# Patient Record
Sex: MALE | Race: WHITE | Employment: OTHER | URBAN - METROPOLITAN AREA
[De-identification: names, ages, dates, MRNs, and addresses within clinical notes are randomized per-mention and may not be internally consistent; named-entity substitution may affect disease eponyms.]

---

## 2018-01-04 ENCOUNTER — RECORDS - HEALTHEAST (OUTPATIENT)
Dept: LAB | Facility: CLINIC | Age: 83
End: 2018-01-04

## 2018-01-04 LAB
ALBUMIN UR-MCNC: ABNORMAL MG/DL
ANION GAP SERPL CALCULATED.3IONS-SCNC: 8 MMOL/L (ref 5–18)
APPEARANCE UR: ABNORMAL
BACTERIA #/AREA URNS HPF: ABNORMAL HPF
BASOPHILS # BLD AUTO: 0 THOU/UL (ref 0–0.2)
BASOPHILS NFR BLD AUTO: 0 % (ref 0–2)
BILIRUB UR QL STRIP: NEGATIVE
BNP SERPL-MCNC: 108 PG/ML (ref 0–93)
BUN SERPL-MCNC: 21 MG/DL (ref 8–28)
CALCIUM SERPL-MCNC: 9.1 MG/DL (ref 8.5–10.5)
CHLORIDE BLD-SCNC: 109 MMOL/L (ref 98–107)
CO2 SERPL-SCNC: 23 MMOL/L (ref 22–31)
COLOR UR AUTO: YELLOW
CREAT SERPL-MCNC: 1.07 MG/DL (ref 0.7–1.3)
EOSINOPHIL # BLD AUTO: 0 THOU/UL (ref 0–0.4)
EOSINOPHIL NFR BLD AUTO: 1 % (ref 0–6)
ERYTHROCYTE [DISTWIDTH] IN BLOOD BY AUTOMATED COUNT: 14.4 % (ref 11–14.5)
GFR SERPL CREATININE-BSD FRML MDRD: >60 ML/MIN/1.73M2
GLUCOSE BLD-MCNC: 115 MG/DL (ref 70–125)
GLUCOSE UR STRIP-MCNC: NEGATIVE MG/DL
HCT VFR BLD AUTO: 40.2 % (ref 40–54)
HGB BLD-MCNC: 13.4 G/DL (ref 14–18)
HGB UR QL STRIP: NEGATIVE
HYALINE CASTS #/AREA URNS LPF: ABNORMAL LPF
KETONES UR STRIP-MCNC: ABNORMAL MG/DL
LEUKOCYTE ESTERASE UR QL STRIP: NEGATIVE
LYMPHOCYTES # BLD AUTO: 0.3 THOU/UL (ref 0.8–4.4)
LYMPHOCYTES NFR BLD AUTO: 4 % (ref 20–40)
MCH RBC QN AUTO: 32 PG (ref 27–34)
MCHC RBC AUTO-ENTMCNC: 33.3 G/DL (ref 32–36)
MCV RBC AUTO: 96 FL (ref 80–100)
MONOCYTES # BLD AUTO: 0.5 THOU/UL (ref 0–0.9)
MONOCYTES NFR BLD AUTO: 6 % (ref 2–10)
MUCOUS THREADS #/AREA URNS LPF: ABNORMAL LPF
NEUTROPHILS # BLD AUTO: 6.2 THOU/UL (ref 2–7.7)
NEUTROPHILS NFR BLD AUTO: 89 % (ref 50–70)
NITRATE UR QL: NEGATIVE
PH UR STRIP: 6 [PH] (ref 4.5–8)
PLATELET # BLD AUTO: 181 THOU/UL (ref 140–440)
PMV BLD AUTO: 11.1 FL (ref 8.5–12.5)
POTASSIUM BLD-SCNC: 4.3 MMOL/L (ref 3.5–5)
RBC # BLD AUTO: 4.19 MILL/UL (ref 4.4–6.2)
RBC #/AREA URNS AUTO: ABNORMAL HPF
SODIUM SERPL-SCNC: 140 MMOL/L (ref 136–145)
SP GR UR STRIP: 1.02 (ref 1–1.03)
SQUAMOUS #/AREA URNS AUTO: ABNORMAL LPF
UROBILINOGEN UR STRIP-ACNC: ABNORMAL
WBC #/AREA URNS AUTO: ABNORMAL HPF
WBC: 7.1 THOU/UL (ref 4–11)

## 2018-02-16 ENCOUNTER — RECORDS - HEALTHEAST (OUTPATIENT)
Dept: LAB | Facility: CLINIC | Age: 83
End: 2018-02-16

## 2018-02-16 LAB
ALBUMIN UR-MCNC: ABNORMAL MG/DL
APPEARANCE UR: CLEAR
BACTERIA #/AREA URNS HPF: ABNORMAL HPF
BILIRUB UR QL STRIP: NEGATIVE
COLOR UR AUTO: YELLOW
GLUCOSE UR STRIP-MCNC: NEGATIVE MG/DL
HGB UR QL STRIP: NEGATIVE
KETONES UR STRIP-MCNC: ABNORMAL MG/DL
LEUKOCYTE ESTERASE UR QL STRIP: NEGATIVE
MUCOUS THREADS #/AREA URNS LPF: ABNORMAL LPF
NITRATE UR QL: NEGATIVE
PH UR STRIP: 5 [PH] (ref 4.5–8)
RBC #/AREA URNS AUTO: ABNORMAL HPF
SP GR UR STRIP: 1.03 (ref 1–1.03)
SQUAMOUS #/AREA URNS AUTO: ABNORMAL LPF
UROBILINOGEN UR STRIP-ACNC: ABNORMAL
WBC #/AREA URNS AUTO: ABNORMAL HPF

## 2018-02-17 LAB — BACTERIA SPEC CULT: NO GROWTH

## 2018-06-29 ENCOUNTER — MEDICAL CORRESPONDENCE (OUTPATIENT)
Dept: HEALTH INFORMATION MANAGEMENT | Facility: CLINIC | Age: 83
End: 2018-06-29

## 2018-07-13 ENCOUNTER — TRANSFERRED RECORDS (OUTPATIENT)
Dept: HEALTH INFORMATION MANAGEMENT | Facility: CLINIC | Age: 83
End: 2018-07-13

## 2018-07-13 ENCOUNTER — APPOINTMENT (OUTPATIENT)
Dept: CT IMAGING | Facility: CLINIC | Age: 83
DRG: 345 | End: 2018-07-13
Attending: EMERGENCY MEDICINE
Payer: COMMERCIAL

## 2018-07-13 ENCOUNTER — HOSPITAL ENCOUNTER (INPATIENT)
Facility: CLINIC | Age: 83
LOS: 1 days | Discharge: NURSING, REHAB OR LONG-TERM CARE | DRG: 345 | End: 2018-07-15
Attending: EMERGENCY MEDICINE | Admitting: HOSPITALIST
Payer: COMMERCIAL

## 2018-07-13 DIAGNOSIS — K21.9 GASTROESOPHAGEAL REFLUX DISEASE WITHOUT ESOPHAGITIS: ICD-10-CM

## 2018-07-13 DIAGNOSIS — R45.1 AGITATION: ICD-10-CM

## 2018-07-13 DIAGNOSIS — N40.1 BENIGN PROSTATIC HYPERPLASIA WITH LOWER URINARY TRACT SYMPTOMS, SYMPTOM DETAILS UNSPECIFIED: ICD-10-CM

## 2018-07-13 DIAGNOSIS — K56.2 SIGMOID VOLVULUS (H): ICD-10-CM

## 2018-07-13 DIAGNOSIS — Z86.73 HISTORY OF TIA (TRANSIENT ISCHEMIC ATTACK) AND STROKE: Primary | ICD-10-CM

## 2018-07-13 DIAGNOSIS — G47.00 INSOMNIA, UNSPECIFIED TYPE: ICD-10-CM

## 2018-07-13 LAB
ALBUMIN SERPL-MCNC: 4.3 G/DL (ref 3.4–5)
ALBUMIN UR-MCNC: 30 MG/DL
ALP SERPL-CCNC: 56 U/L (ref 40–150)
ALT SERPL W P-5'-P-CCNC: 16 U/L (ref 0–70)
ANION GAP SERPL CALCULATED.3IONS-SCNC: 11 MMOL/L (ref 3–14)
APPEARANCE UR: CLEAR
AST SERPL W P-5'-P-CCNC: 14 U/L (ref 0–45)
BASOPHILS # BLD AUTO: 0 10E9/L (ref 0–0.2)
BASOPHILS NFR BLD AUTO: 0 %
BILIRUB SERPL-MCNC: 0.7 MG/DL (ref 0.2–1.3)
BILIRUB UR QL STRIP: NEGATIVE
BUN SERPL-MCNC: 26 MG/DL (ref 7–30)
CALCIUM SERPL-MCNC: 9.2 MG/DL (ref 8.5–10.1)
CHLORIDE SERPL-SCNC: 107 MMOL/L (ref 94–109)
CO2 SERPL-SCNC: 23 MMOL/L (ref 20–32)
COLOR UR AUTO: YELLOW
CREAT SERPL-MCNC: 0.78 MG/DL (ref 0.66–1.25)
DIFFERENTIAL METHOD BLD: ABNORMAL
EOSINOPHIL # BLD AUTO: 0 10E9/L (ref 0–0.7)
EOSINOPHIL NFR BLD AUTO: 0 %
ERYTHROCYTE [DISTWIDTH] IN BLOOD BY AUTOMATED COUNT: 15.4 % (ref 10–15)
GFR SERPL CREATININE-BSD FRML MDRD: >90 ML/MIN/1.7M2
GLUCOSE SERPL-MCNC: 144 MG/DL (ref 70–99)
GLUCOSE UR STRIP-MCNC: NEGATIVE MG/DL
HCT VFR BLD AUTO: 41.3 % (ref 40–53)
HGB BLD-MCNC: 13.6 G/DL (ref 13.3–17.7)
HGB UR QL STRIP: NEGATIVE
HYALINE CASTS #/AREA URNS LPF: 1 /LPF (ref 0–2)
IMM GRANULOCYTES # BLD: 0 10E9/L (ref 0–0.4)
IMM GRANULOCYTES NFR BLD: 0.2 %
INR PPP: 1.14 (ref 0.86–1.14)
INTERPRETATION ECG - MUSE: NORMAL
KETONES UR STRIP-MCNC: 10 MG/DL
LACTATE BLD-SCNC: 1.5 MMOL/L (ref 0.7–2)
LEUKOCYTE ESTERASE UR QL STRIP: NEGATIVE
LIPASE SERPL-CCNC: 353 U/L (ref 73–393)
LYMPHOCYTES # BLD AUTO: 0.9 10E9/L (ref 0.8–5.3)
LYMPHOCYTES NFR BLD AUTO: 13.2 %
MCH RBC QN AUTO: 32.3 PG (ref 26.5–33)
MCHC RBC AUTO-ENTMCNC: 32.9 G/DL (ref 31.5–36.5)
MCV RBC AUTO: 98 FL (ref 78–100)
MONOCYTES # BLD AUTO: 0.3 10E9/L (ref 0–1.3)
MONOCYTES NFR BLD AUTO: 3.9 %
MUCOUS THREADS #/AREA URNS LPF: PRESENT /LPF
NEUTROPHILS # BLD AUTO: 5.3 10E9/L (ref 1.6–8.3)
NEUTROPHILS NFR BLD AUTO: 82.7 %
NITRATE UR QL: NEGATIVE
NRBC # BLD AUTO: 0 10*3/UL
NRBC BLD AUTO-RTO: 0 /100
PH UR STRIP: 5.5 PH (ref 5–7)
PLATELET # BLD AUTO: 191 10E9/L (ref 150–450)
POTASSIUM SERPL-SCNC: 3.4 MMOL/L (ref 3.4–5.3)
PROT SERPL-MCNC: 7.5 G/DL (ref 6.8–8.8)
RBC # BLD AUTO: 4.21 10E12/L (ref 4.4–5.9)
RBC #/AREA URNS AUTO: 1 /HPF (ref 0–2)
SODIUM SERPL-SCNC: 141 MMOL/L (ref 133–144)
SOURCE: ABNORMAL
SP GR UR STRIP: 1.03 (ref 1–1.03)
SQUAMOUS #/AREA URNS AUTO: <1 /HPF (ref 0–1)
TRANS CELLS #/AREA URNS HPF: <1 /HPF (ref 0–1)
TROPONIN I SERPL-MCNC: 0.04 UG/L (ref 0–0.04)
TSH SERPL DL<=0.005 MIU/L-ACNC: 1.92 MU/L (ref 0.4–4)
UROBILINOGEN UR STRIP-MCNC: NORMAL MG/DL (ref 0–2)
WBC # BLD AUTO: 6.4 10E9/L (ref 4–11)
WBC #/AREA URNS AUTO: 1 /HPF (ref 0–5)

## 2018-07-13 PROCEDURE — 93005 ELECTROCARDIOGRAM TRACING: CPT | Performed by: EMERGENCY MEDICINE

## 2018-07-13 PROCEDURE — 96360 HYDRATION IV INFUSION INIT: CPT | Performed by: EMERGENCY MEDICINE

## 2018-07-13 PROCEDURE — 83605 ASSAY OF LACTIC ACID: CPT | Performed by: EMERGENCY MEDICINE

## 2018-07-13 PROCEDURE — 85610 PROTHROMBIN TIME: CPT | Performed by: EMERGENCY MEDICINE

## 2018-07-13 PROCEDURE — 81001 URINALYSIS AUTO W/SCOPE: CPT | Performed by: EMERGENCY MEDICINE

## 2018-07-13 PROCEDURE — 99285 EMERGENCY DEPT VISIT HI MDM: CPT | Mod: Z6 | Performed by: EMERGENCY MEDICINE

## 2018-07-13 PROCEDURE — 25000128 H RX IP 250 OP 636: Performed by: EMERGENCY MEDICINE

## 2018-07-13 PROCEDURE — 74177 CT ABD & PELVIS W/CONTRAST: CPT

## 2018-07-13 PROCEDURE — 99285 EMERGENCY DEPT VISIT HI MDM: CPT | Mod: 25 | Performed by: EMERGENCY MEDICINE

## 2018-07-13 PROCEDURE — 83690 ASSAY OF LIPASE: CPT | Performed by: EMERGENCY MEDICINE

## 2018-07-13 PROCEDURE — 84443 ASSAY THYROID STIM HORMONE: CPT | Performed by: EMERGENCY MEDICINE

## 2018-07-13 PROCEDURE — 80053 COMPREHEN METABOLIC PANEL: CPT | Performed by: EMERGENCY MEDICINE

## 2018-07-13 PROCEDURE — 85025 COMPLETE CBC W/AUTO DIFF WBC: CPT | Performed by: EMERGENCY MEDICINE

## 2018-07-13 PROCEDURE — 84484 ASSAY OF TROPONIN QUANT: CPT | Performed by: EMERGENCY MEDICINE

## 2018-07-13 RX ORDER — SODIUM CHLORIDE 9 MG/ML
INJECTION, SOLUTION INTRAVENOUS CONTINUOUS
Status: DISCONTINUED | OUTPATIENT
Start: 2018-07-13 | End: 2018-07-14

## 2018-07-13 RX ORDER — IOPAMIDOL 755 MG/ML
78 INJECTION, SOLUTION INTRAVASCULAR ONCE
Status: COMPLETED | OUTPATIENT
Start: 2018-07-13 | End: 2018-07-13

## 2018-07-13 RX ADMIN — IOPAMIDOL 78 ML: 755 INJECTION, SOLUTION INTRAVENOUS at 21:51

## 2018-07-13 ASSESSMENT — ENCOUNTER SYMPTOMS
ABDOMINAL DISTENTION: 1
CONSTIPATION: 1

## 2018-07-13 NOTE — LETTER
Health Information Management Services               Recipient:    [facility]   [phone]      Sender:  Peri Pinedo, [facility] [phone]        Date: July 15, 2018  Patient Name:  Hardy Acevedo  Routing Message:    D/C Orders for [place]  Transport Time: 1pm     RN station: [phone]        The documents accompanying this notice contain confidential information belonging to the sender.  This information is intended only for the use of the individual or entity named above.  The authorized recipient of this information is prohibited from disclosing this information to any other party and is required to destroy the information after its stated need has been fulfilled, unless otherwise required by state law.      If you are not the intended recipient, you are hereby notified that any disclosure, copying, distribution or action taken in reliance on the contents of these documents is strictly prohibited. If you have received this document in error, please notify [place] immediately at [phone].  You may return the document via fax ([phone]) or return mail  (Health Information Management, , [address] [place] [address]).

## 2018-07-13 NOTE — IP AVS SNAPSHOT
[address] [address]    Phone:  [phone]                                       After Visit Summary   7/13/2018    Hardy Acevedo    MRN: [id number]           After Visit Summary Signature Page     I have received my discharge instructions, and my questions have been answered. I have discussed any challenges I see with this plan with the nurse or doctor.    ..........................................................................................................................................  Patient/Patient Representative Signature      ..........................................................................................................................................  Patient Representative Print Name and Relationship to Patient    ..................................................               ................................................  Date                                            Time    ..........................................................................................................................................  Reviewed by Signature/Title    ...................................................              ..............................................  Date                                                            Time

## 2018-07-13 NOTE — IP AVS SNAPSHOT
MRN:[id number]                      After Visit Summary   7/13/2018    Hardy Acevedo    MRN: [id number]           Thank you!     Thank you for choosing [facility] for your care. Our goal is always to provide you with excellent care. Hearing back from our patients is one way we can continue to improve our services. Please take a few minutes to complete the written survey that you may receive in the mail after you visit with us. Thank you!        Patient Information     Date Of Birth          1/7/1923        Designated Caregiver       Most Recent Value    Caregiver    Will someone help with your care after discharge? yes    Name of designated caregiver pt lives in memory care unit    Phone number of caregiver see above    Caregiver address see above      About your hospital stay     You were admitted on:  July 14, 2018 You last received care in the:  [address] [facility]    You were discharged on:  July 15, 2018        Reason for your hospital stay       Admitted for sigmoid volvulus SP reduction of volvulus by flex sig                  Who to Call     For medical emergencies, please call 911.  For non-urgent questions about your medical care, please call your primary care provider or clinic, None  For questions related to your surgery, please call your surgery clinic        Attending Provider     Provider Specialty    Nakita Kumar MD Emergency Medicine    Jovanny Ramos MD Internal Medicine       Primary Care Provider Fax #    Provider Not In System [phone]      After Care Instructions     Activity       Your activity upon discharge: activity as tolerated            Diet       Follow this diet upon discharge: Orders Placed This Encounter      Regular Diet Adult                  Follow-up Appointments     Adult [facility] Follow-up and recommended labs and tests       Follow up with primary care provider, Provider Not In System, within 7 days for hospital follow- up.  No follow up  "labs or test are needed.      Appointments on [place] and/or [facility] (with [facility] or [facility] provider or service). Call [phone] if you haven't heard regarding these appointments within 7 days of discharge.                  Pending Results     Date and Time Order Name Status Description    2018 0902 EKG 12-lead, complete Preliminary             Statement of Approval     Ordered          07/15/18 0956  I have reviewed and agree with all the recommendations and orders detailed in this document.  EFFECTIVE NOW     Approved and electronically signed by:  Lyndon Hua MD             Admission Information     Date & Time Department Dept. Phone    2018 [address] [facility] [place] [phone]      Your Vitals Were     Blood Pressure Pulse Temperature Respirations Weight Pulse Oximetry    95/58 85 96.3  F (35.7  C) (Axillary) 18 71.7 kg (158 lb) 97%      MyChart Information     [employer] lets you send messages to your doctor, view your test results, renew your prescriptions, schedule appointments and more. To sign up, go to [email or web address] . Click on \"Log in\" on the left side of the screen, which will take you to the Welcome page. Then click on \"Sign up Now\" on the right side of the page.     You will be asked to enter the access code listed below, as well as some personal information. Please follow the directions to create your username and password.     Your access code is: [email or web address]  Expires: 10/13/2018 10:38 AM     Your access code will  in 90 days. If you need help or a new code, please call your [facility] clinic or [phone].        Care EveryWhere ID     This is your Care EveryWhere ID. This could be used by other organizations to access your [facility] medical records  [id number]        Equal Access to Services     ADRYAN PAVON : Noelle Goodwin, charlene varner, barbara vaca. So watalita " [phone].    ATENCIÓN: Si kaiden banegas, tiene a gu disposición servicios gratuitos de asistencia lingüística. Cuco carlson [phone].    We comply with applicable federal civil rights laws and [place] laws. We do not discriminate on the basis of race, color, national origin, age, disability, sex, sexual orientation, or gender identity.               Review of your medicines      START taking        Dose / Directions    acetaminophen 325 MG tablet   Commonly known as:  TYLENOL   Used for:  Sigmoid volvulus (H)        Dose:  650 mg   Take 2 tablets (650 mg) by mouth every 6 hours as needed for mild pain   Quantity:  100 tablet   Refills:  0       clopidogrel 75 MG tablet   Commonly known as:  PLAVIX   Used for:  History of TIA (transient ischemic attack) and stroke        Dose:  75 mg   Start taking on:  7/16/2018   Take 1 tablet (75 mg) by mouth daily   Quantity:  30 tablet   Refills:  0       hydrOXYzine 10 MG/5ML syrup   Commonly known as:  ATARAX   Used for:  Agitation        Dose:  25 mg   Take 12.5 mLs (25 mg) by mouth every 6 hours as needed for anxiety (agitation)   Quantity:  450 mL   Refills:  0       magnesium hydroxide 400 MG/5ML suspension   Commonly known as:  MILK OF MAGNESIA   Used for:  Sigmoid volvulus (H)        Dose:  15 mL   Take 15 mLs by mouth daily as needed for constipation   Quantity:  105 mL   Refills:  0       melatonin 1 MG Tabs tablet   Used for:  Insomnia, unspecified type        Dose:  1 mg   Take 1 tablet (1 mg) by mouth nightly as needed for sleep   Refills:  0       pantoprazole 40 MG EC tablet   Commonly known as:  PROTONIX   Used for:  Gastroesophageal reflux disease without esophagitis        Dose:  40 mg   Start taking on:  7/16/2018   Take 1 tablet (40 mg) by mouth every morning (before breakfast)   Quantity:  30 tablet   Refills:  0       polyethylene glycol Packet   Commonly known as:  MIRALAX/GLYCOLAX   Used for:  Sigmoid volvulus (H)        Dose:  17 g   Take 17 g by  mouth 2 times daily   Quantity:  7 packet   Refills:  0       sennosides 8.6 MG tablet   Commonly known as:  SENOKOT   Used for:  Sigmoid volvulus (H)        Dose:  2 tablet   Take 2 tablets by mouth 2 times daily   Quantity:  120 each   Refills:  0       tamsulosin 0.4 MG capsule   Commonly known as:  FLOMAX   Used for:  Benign prostatic hyperplasia with lower urinary tract symptoms, symptom details unspecified        Dose:  0.4 mg   Start taking on:  7/16/2018   Take 1 capsule (0.4 mg) by mouth daily   Quantity:  60 capsule   Refills:  0       traZODone 5 mg/ml Susp   Commonly known as:  DESYREL   Used for:  Agitation, Insomnia, unspecified type        Dose:  12.5 mg   Take 2.5 mLs (12.5 mg) by mouth At Bedtime   Refills:  0            Where to get your medicines      These medications were sent to [facility] [place], [place] - [address] [address]     Phone:  [phone]     hydrOXYzine 10 MG/5ML syrup         Some of these will need a paper prescription and others can be bought over the counter. Ask your nurse if you have questions.     You don't need a prescription for these medications     acetaminophen 325 MG tablet    clopidogrel 75 MG tablet    magnesium hydroxide 400 MG/5ML suspension    melatonin 1 MG Tabs tablet    pantoprazole 40 MG EC tablet    polyethylene glycol Packet    sennosides 8.6 MG tablet    tamsulosin 0.4 MG capsule    traZODone 5 mg/ml Susp                Protect others around you: Learn how to safely use, store and throw away your medicines at www.disposemymeds.org.             Medication List: This is a list of all your medications and when to take them. Check marks below indicate your daily home schedule. Keep this list as a reference.      Medications           Morning Afternoon Evening Bedtime As Needed    acetaminophen 325 MG tablet   Commonly known as:  TYLENOL   Take 2 tablets (650 mg) by mouth every 6 hours as needed for  mild pain   Last time this was given:  650 mg on 7/14/2018 10:35 AM                                clopidogrel 75 MG tablet   Commonly known as:  PLAVIX   Take 1 tablet (75 mg) by mouth daily   Start taking on:  7/16/2018   Last time this was given:  75 mg on 7/15/2018  9:20 AM                                hydrOXYzine 10 MG/5ML syrup   Commonly known as:  ATARAX   Take 12.5 mLs (25 mg) by mouth every 6 hours as needed for anxiety (agitation)   Last time this was given:  25 mg on 7/14/2018  2:25 PM                                magnesium hydroxide 400 MG/5ML suspension   Commonly known as:  MILK OF MAGNESIA   Take 15 mLs by mouth daily as needed for constipation                                melatonin 1 MG Tabs tablet   Take 1 tablet (1 mg) by mouth nightly as needed for sleep                                pantoprazole 40 MG EC tablet   Commonly known as:  PROTONIX   Take 1 tablet (40 mg) by mouth every morning (before breakfast)   Start taking on:  7/16/2018   Last time this was given:  40 mg on 7/15/2018  9:19 AM                                polyethylene glycol Packet   Commonly known as:  MIRALAX/GLYCOLAX   Take 17 g by mouth 2 times daily   Last time this was given:  17 g on 7/15/2018  9:19 AM                                sennosides 8.6 MG tablet   Commonly known as:  SENOKOT   Take 2 tablets by mouth 2 times daily                                tamsulosin 0.4 MG capsule   Commonly known as:  FLOMAX   Take 1 capsule (0.4 mg) by mouth daily   Start taking on:  7/16/2018   Last time this was given:  0.4 mg on 7/15/2018  9:20 AM                                traZODone 5 mg/ml Susp   Commonly known as:  DESYREL   Take 2.5 mLs (12.5 mg) by mouth At Bedtime

## 2018-07-13 NOTE — IP AVS SNAPSHOT
Hardy Acevedo #[id number] (CSN: [id number])  (89 year old M)  (Adm: 18)     [id number]               UNIT [address]D [facility]: [phone]            Patient Demographics     Patient Name Sex          Age SSN Address Phone    Hardy Acevedo Male 1923 (89 year old)  [address]   [place] [address] [phone] (Home)  [phone] (Mobile)      Emergency Contact(s)     Name Relation Home Work Mobile    Rachele Blanchard Daughter [phone]    Marcelino Acevedo Son [phone]      Admission Information     Attending Provider Admitting Provider Admission Type Admission Date/Time     Jovanny Ramos MD Emergency 18  1935    Discharge Date Hospital Service Auth/Cert Status Service Area     Internal Medicine [facility]    Unit Room/Bed Admission Status       UU U7D 7515/7515-01 Admission (Confirmed)       Admission     Complaint    sigmoid volulus, sigmoid volvulus, Volvulus (H)      Hospital Account     Name Acct ID Class Status Primary Coverage    Hardy Acevedo [id number] Inpatient Open [employer]ig - [employer] MEDICARE ADVANTAGE            Guarantor Account (for Hospital Account #[id number])     Name Relation to Pt Service Area Active? Acct Type    Hardy Acevedo Self FCS Yes Personal/Family    Address Phone          [address]   [place] [address] [phone](H)              Coverage Information (for Hospital Account #[id number])     F/O Payor/Plan Precert #    COMMERCIAL/[employer] MEDICARE ADVANTAGE     Subscriber Subscriber #    Hardy Acevedo [id number]    Address Phone    [address] [address]  [place] [address]                                                 INTERAGENCY TRANSFER FORM - PHYSICIAN ORDERS   2018                       UNIT 7D [facility]: [phone]            Attending Provider: (none)    Allergies:   Atorvastatin, Penicillins    Infection:  None   Service:  INTERNAL MED    Ht:  --   Wt:  71.7 kg (158 lb)   Admission Wt:  71.2 kg (157 lb)    BMI:  --   BSA:  --            ED Clinical Impression     Diagnosis Description Comment Added By Time Added    Sigmoid volvulus (H) [K56.2] Sigmoid volvulus (H) [K56.2]  Nakita Kumar MD 7/13/2018 11:36 PM      Hospital Problems as of 7/15/2018              Priority Class Noted POA    Volvulus (H) Medium  7/14/2018 Yes      Non-Hospital Problems as of 7/15/2018     None      Code Status History     Date Active Date Inactive Code Status Order ID Comments User Context    This patient has a current code status but no historical code status.      Current Code Status     Date Active Code Status Order ID Comments User Context       7/14/2018  5:29 AM DNR/DNI [id number]  Diana Buck MD Inpatient       Summary of Visit     Reason for your hospital stay       Admitted for sigmoid volvulus SP reduction of volvulus by flex sig                Medication Review      START taking        Dose / Directions Comments    acetaminophen 325 MG tablet   Commonly known as:  TYLENOL   Used for:  Sigmoid volvulus (H)        Dose:  650 mg   Take 2 tablets (650 mg) by mouth every 6 hours as needed for mild pain   Quantity:  100 tablet   Refills:  0        clopidogrel 75 MG tablet   Commonly known as:  PLAVIX   Used for:  History of TIA (transient ischemic attack) and stroke        Dose:  75 mg   Start taking on:  7/16/2018   Take 1 tablet (75 mg) by mouth daily   Quantity:  30 tablet   Refills:  0        hydrOXYzine 10 MG/5ML syrup   Commonly known as:  ATARAX   Used for:  Agitation        Dose:  25 mg   Take 12.5 mLs (25 mg) by mouth every 6 hours as needed for anxiety (agitation)   Quantity:  450 mL   Refills:  0        magnesium hydroxide 400 MG/5ML suspension   Commonly known as:  MILK OF MAGNESIA   Used for:  Sigmoid volvulus (H)        Dose:  15 mL   Take 15 mLs by mouth daily as needed  for constipation   Quantity:  105 mL   Refills:  0        melatonin 1 MG Tabs tablet   Used for:  Insomnia, unspecified type        Dose:  1 mg   Take 1 tablet (1 mg) by mouth nightly as needed for sleep   Refills:  0        pantoprazole 40 MG EC tablet   Commonly known as:  PROTONIX   Used for:  Gastroesophageal reflux disease without esophagitis        Dose:  40 mg   Start taking on:  7/16/2018   Take 1 tablet (40 mg) by mouth every morning (before breakfast)   Quantity:  30 tablet   Refills:  0        polyethylene glycol Packet   Commonly known as:  MIRALAX/GLYCOLAX   Used for:  Sigmoid volvulus (H)        Dose:  17 g   Take 17 g by mouth 2 times daily   Quantity:  7 packet   Refills:  0        sennosides 8.6 MG tablet   Commonly known as:  SENOKOT   Used for:  Sigmoid volvulus (H)        Dose:  2 tablet   Take 2 tablets by mouth 2 times daily   Quantity:  120 each   Refills:  0        tamsulosin 0.4 MG capsule   Commonly known as:  FLOMAX   Used for:  Benign prostatic hyperplasia with lower urinary tract symptoms, symptom details unspecified        Dose:  0.4 mg   Start taking on:  7/16/2018   Take 1 capsule (0.4 mg) by mouth daily   Quantity:  60 capsule   Refills:  0        traZODone 5 mg/ml Susp   Commonly known as:  DESYREL   Used for:  Agitation, Insomnia, unspecified type        Dose:  12.5 mg   Take 2.5 mLs (12.5 mg) by mouth At Bedtime   Refills:  0                After Care     Activity       Your activity upon discharge: activity as tolerated       Diet       Follow this diet upon discharge: Orders Placed This Encounter      Regular Diet Adult             Follow-Up Appointment Instructions     Adult [facility] Follow-up and recommended labs and tests       Follow up with primary care provider, Provider Not In System, within 7 days for hospital follow- up.  No follow up labs or test are needed.      Appointments on [place] and/or [facility] (with [facility] or [facility] provider or service). Call  [phone] if you haven't heard regarding these appointments within 7 days of discharge.             Statement of Approval     Ordered          07/15/18 0956  I have reviewed and agree with all the recommendations and orders detailed in this document.  EFFECTIVE NOW     Approved and electronically signed by:  Lyndon Hua MD                                                 INTERAGENCY TRANSFER FORM - NURSING   7/13/2018                       UNIT 7D [facility] BANK: [phone]            Attending Provider: (none)    Allergies:  Atorvastatin, Penicillins    Infection:  None   Service:  INTERNAL MED    Ht:  --   Wt:  71.7 kg (158 lb)   Admission Wt:  71.2 kg (157 lb)    BMI:  --   BSA:  --            Advance Directives        Scanned docmt in ACP Activity?           No scanned doc        Immunizations     None      ASSESSMENT     Discharge Profile Flowsheet     GASTROINTESTINAL (ADULT,PEDIATRIC,OB)     Skin WDL  (P)  ex 07/15/18 1004    GI WDL  (P)  ex 07/15/18 1004   Skin Temperature  (P)  warm 07/15/18 1004    Abdominal Appearance  (P)  rounded;other (see comments) (soft) 07/15/18 1004   Skin Moisture  (P)  dry;flaky 07/15/18 1004    Last Bowel Movement  07/14/18 07/15/18 0104   Skin Integrity  (P)  scab(s);bruise(s);scar(s) 07/15/18 1004    COMMUNICATION ASSESSMENT     SAFETY      Patient's communication style  spoken language (English or Bilingual) 07/14/18 0626   Safety WDL  ex 07/15/18 1018    Use of hearing assistive devices  none 07/13/18 1934   Safety Factors  upper side rails raised x 2, lower side rail raised x 1;bed in low position;wheels locked;call light in reach;ID band on;other (see comments) (sitter) 07/15/18 1018    SKIN     All Alarms  alarm(s) activated and audible (sitter) 07/15/18 0104    Inspection of bony prominences  (P)  Full 07/15/18 1004                      Assessment WDL (Within Defined Limits) Definitions           Safety WDL     Effective: 09/28/15    Row Information:  "<b>WDL Definition:</b> Bed in low position, wheels locked; call light in reach; upper side rails up x 2; ID band on<br> <font color=\"gray\"><i>Item=AS safety wdl>>List=AS safety wdl>>Version=F14</i></font>      Skin WDL     Effective: 09/28/15    Row Information: <b>WDL Definition:</b> Warm; dry; intact; elastic; without discoloration; pressure points without redness<br> <font color=\"gray\"><i>Item=AS skin wdl>>List=AS skin wdl>>Version=F14</i></font>      Vitals     Vital Signs Flowsheet     VITAL SIGNS     Weight Method  Bed scale 07/14/18 1237    Temp  97.5  F (36.4  C) 07/15/18 0736   POSITIONING      Temp src  Axillary 07/15/18 0736   Body Position  legs elevated;supine, legs elevated 07/15/18 0410    Resp  18 07/15/18 0736   Head of Bed (HOB)  HOB at 30-45 degrees 07/15/18 0410    Pulse  85 07/15/18 0223   Chair  Upright in chair 07/15/18 1018    Heart Rate  77 07/15/18 0736   Positioning/Transfer Devices  pillows;in use 07/15/18 0410    Pulse/Heart Rate Source  Monitor 07/15/18 0223   DAILY CARE      BP  126/68 07/15/18 0736   Activity Management  standing at bedside;other (see comments) (Incontinence care Provided) 07/15/18 0410    BP Location  Left arm 07/15/18 0223   Activity Assistance Provided  assistance, 1 person 07/15/18 0410    OXYGEN THERAPY     ECG      SpO2  95 % 07/15/18 0736   ECG Rhythm  Sinus rhythm 07/14/18 0441    O2 Device  None (Room air) 07/15/18 0736   Ectopy  None 07/14/18 0441    Oxygen Delivery  2 LPM 07/14/18 0429   Lead Monitored  Lead II;V 5 07/14/18 0441    PAIN/COMFORT     RESPIRATORY MONITORING      Patient Currently in Pain  sleeping: patient not able to self report 07/15/18 0016   Respiratory Monitoring (EtCO2)  -- (pt not tolerating NC) 07/14/18 0534    Preferred Pain Scale  CAPA (Clinically Aligned Pain Assessment) ([facility] Adults Only) 07/14/18 1753   MIKAYLA COMA SCALE      CLINICALLY ALIGNED PAIN ASSESSMENT (CAPA) ([facility] ADULTS ONLY)    "  Best Eye Response  (P)  4-->(E4) spontaneous 07/15/18 1004    Comfort  comfortably manageable 07/14/18 1757   Best Motor Response  (P)  6-->(M6) obeys commands 07/15/18 1004    Change in Pain  about the same 07/14/18 0018   Best Verbal Response  (P)  4-->(V4) confused 07/15/18 1004    HEIGHT AND WEIGHT     [place] Coma Scale Score  (P)  14 07/15/18 1004    Weight  71.7 kg (158 lb) 07/14/18 1237                 Patient Lines/Drains/Airways Status    Active LINES/DRAINS/AIRWAYS     Name: Placement date: Placement time: Site: Days: Last dressing change:    Peripheral IV 07/14/18 Right Upper forearm 07/14/18   1437   Upper forearm   less than 1     Peripheral IV 07/14/18 Right;Anterior Lower forearm 07/14/18   1447   Lower forearm   less than 1             Patient Lines/Drains/Airways Status    Active PICC/CVC     None            Intake/Output Detail Report     Date Intake     Output     Net    Shift P.O. I.V. IV Piggyback Total Urine Other Blood Total       Day 07/14/18 0000 - 07/14/18 0659 0 670 -- 670 0 -- 0 -- 670    Bethany 07/14/18 0700 - 07/14/18 1459 -- 390 -- 390 -- -- -- -- 390    Noc 07/14/18 1500 - 07/14/18 2359 -- -- -- -- 200 -- -- 200 -200    Day 07/15/18 0000 - 07/15/18 0659 -- -- -- -- 150 -- -- 150 -150    Bethany 07/15/18 0700 - 07/15/18 1459 500 -- -- 500 400 -- -- 400 100      Last Void/BM       Most Recent Value    Urine Occurrence 1 at 07/15/2018 0200    Stool Occurrence 1 at 07/15/2018 0404      Case Management/Discharge Planning     Case Management/Discharge Planning Flowsheet     LIVING ENVIRONMENT     QUESTION TO PATIENT: Do you feel safe going back to the place where you are living?  yes 07/14/18 0626    Lives With  facility resident 07/14/18 0626   OBSERVATION: Is there reason to believe there has been maltreatment of a vulnerable adult (ie. Physical/Sexual/Emotional abuse, self neglect, lack of adequate food, shelter, medical care, or financial exploitation)?  no 07/14/18 0626    COPING/STRESS      OTHER      Major Change/Loss/Stressor  denies 07/14/18 1454   Are you depressed or being treated for depression?  No 07/14/18 0626    ABUSE RISK SCREEN     HOMICIDE RISK      QUESTION TO PATIENT:  Has a member of your family or a partner(now or in the past) intimidated, hurt, manipulated, or controlled you in any way?  no 07/14/18 0626   Feels Like Hurting Others  no 07/13/18 1939                  UNIT 7D [facility] BANK: [phone]            Medication Administration Report for Curtis Hardy Nelson as of 07/15/18 1039   Legend:    Given Hold Not Given Due Canceled Entry Other Actions    Time Time (Time) Time  Time-Action       Inactive    Active    Linked        Medications 07/09/18 07/10/18 07/11/18 07/12/18 07/13/18 07/14/18 07/15/18      Rate: 100 mL/hr   Freq: CONTINUOUS Route: IV  Start: 07/14/18 0945   End: 07/14/18 1944   Last Admin: 07/14/18 1105  Dispense Loc: [facility] ADS 7D  Volume: 1,000 mL          1105 ( )-New Bag       1944-Med Discontinued        acetaminophen (TYLENOL) tablet 650 mg  Dose: 650 mg  Freq: EVERY 4 HOURS PRN Route: PO  PRN Reason: mild pain  Start: 07/14/18 0529   Admin Instructions: Alternate ibuprofen (if ordered) with acetaminophen.  Maximum acetaminophen dose from all sources = 75 mg/kg/day not to exceed 4 grams/day.    Admin. Amount: 2 tablet (2 × 325 mg tablet)  Last Admin: 07/14/18 1035  Dispense Loc: [facility] ADS 7D          1035 (650 mg)-Given            clopidogrel (PLAVIX) tablet 75 mg  Dose: 75 mg  Freq: DAILY Route: PO  Start: 07/14/18 0930   Admin. Amount: 1 tablet (1 × 75 mg tablet)  Last Admin: 07/15/18 0920  Dispense Loc: [facility] ADS 7D          1037 (75 mg)-Given        0920 (75 mg)-Given           glycerin (adult) Suppository 1 suppository  Dose: 1 suppository  Freq: DAILY PRN Route: RE  PRN Reason: constipation  Start: 07/14/18 0344   Admin. Amount: 1 suppository  Dispense Loc: [facility]               haloperidol lactate (HALDOL) injection 2 mg  Dose: 2  mg  Freq: EVERY 6 HOURS PRN Route: IV  PRN Reason: agitation  Start: 18 1352   Admin Instructions: For ordered doses up to 5 mg, drug can be give IV Push undiluted over 1 minute.    Admin. Amount: 2 mg = 0.4 mL Conc: 5 mg/mL  Last Admin: 18 1548  Dispense Loc: [facility]  Infused Over: 1 Minutes  Volume: 1 mL          1548 (2 mg)-Given            hydrOXYzine (ATARAX) syrup 25 mg  Dose: 25 mg  Freq: EVERY 6 HOURS PRN Route: PO  PRN Reason: anxiety  PRN Comment: agitation  Start: 18 1351   Admin. Amount: 25 mg = 12.5 mL Conc: 2 mg/mL  Last Admin: 18 142  Dispense Loc: [facility]  Volume: 12.5 mL          1425 (25 mg)-Given            hydrOXYzine (ATARAX) tablet 50 mg  Dose: 50 mg  Freq: ONCE Route: PO  Start: 18   Admin. Amount: 2 tablet (2 × 25 mg tablet)  Dispense Loc: [facility] ADS 7D  Administrations Remainin          ()-Not Given            labetalol (NORMODYNE/TRANDATE) injection 5 mg  Dose: 5 mg  Freq: EVERY 10 MIN PRN Route: IV  PRN Reason: high blood pressure  PRN Comment: for Systolic Blood Pressure greater than 140 mmHg and Heart Rate greater than 60 bpm, Max cumulative dose = 20 mg  Start: 18 0308   Admin Instructions: For PACU USE ONLY.  For ordered doses up to 80 mg, give IV Push undiluted. Give each 20 mg over 2 minutes.    Admin. Amount: 5 mg = 1 mL Conc: 5 mg/mL  Dispense Loc: [facility]  Infused Over: 2-8 Minutes  Administrations Remainin  Volume: 1 mL  POC: PACU               magnesium hydroxide (MILK OF MAGNESIA) suspension 15 mL  Dose: 15 mL  Freq: DAILY PRN Route: PO  PRN Reason: constipation  Start: 18 0926   Admin Instructions: Shake well.    Admin. Amount: 15 mL  Dispense Loc: [facility]  Volume: 15 mL               melatonin tablet 1 mg  Dose: 1 mg  Freq: AT BEDTIME PRN Route: PO  PRN Reason: sleep  Start: 18 0529   Admin Instructions: Do not give unless at least 6 hours of  uninterrupted sleep is expected.    Admin. Amount: 1 tablet (1 × 1 mg tablet)  Dispense Loc: [facility] ADS 7D                 Dose: 0.1-0.4 mg  Freq: EVERY 2 MIN PRN Route: IV  PRN Reason: opioid reversal  Start: 07/14/18 0308   End: 07/15/18 0307   Admin Instructions: For apnea or imminent respiratory arrest: give 0.4 mg IV undiluted Q 2 minutes PRN until desired degree of reversal is obtained, stop opioid and notify provider. Continue monitoring until discharge are criteria met for a minimum of 2 hours.  For severe sedation, decrease in respiratory depth, quality or Respiratory Rate greater than 8: give 0.1 mg IV Q 2 minutes x 3 doses, stop opioid and notify provider.  Try to minimize reversal of analgesia especially in end-of-life patients.  Continue monitoring until discharge criteria are met for a minimum of 2 hours.  For ordered doses up to 2mg give IVP. Give each 0.4mg over 15 seconds in emergency situations. For non-emergent situations further dilute in 9mL of NS to facilitate titration of response.    Admin. Amount: 0.1-0.4 mg = 0.25-1 mL Conc: 0.4 mg/mL  Dispense Loc: [facility] ADS 7D  Volume: 1 mL  POC: PACU/Phase II           0307-Med Discontinued       naloxone (NARCAN) injection 0.1-0.4 mg  Dose: 0.1-0.4 mg  Freq: EVERY 2 MIN PRN Route: IV  PRN Reason: opioid reversal  Start: 07/14/18 0529   Admin Instructions: For respiratory rate LESS than or EQUAL to 8.  Partial reversal dose:  0.1 mg titrated q 2 minutes for Analgesia Side Effects Monitoring Sedation Level of 3 (frequently drowsy, arousable, drifts to sleep during conversation).Full reversal dose:  0.4 mg bolus for Analgesia Side Effects Monitoring Sedation Level of 4 (somnolent, minimal or no response to stimulation).  For ordered doses up to 2mg give IVP. Give each 0.4mg over 15 seconds in emergency situations. For non-emergent situations further dilute in 9mL of NS to facilitate titration of response.    Admin. Amount: 0.1-0.4 mg = 0.25-1 mL Conc:  0.4 mg/mL  Dispense Loc: [facility] ADS 7D  Volume: 1 mL               ondansetron (ZOFRAN-ODT) ODT tab 4 mg  Dose: 4 mg  Freq: EVERY 30 MIN PRN Route: PO  PRN Reasons: nausea,vomiting  Start: 18   Admin Instructions: MAX total dose = 8 mg, including OR dosing. This is step 1 of nausea and vomiting management.  If not resolved in 15 minutes, then go to step 2 [prochlorperazine (COMPAZINE) if ordered].  With dry hands, peel back foil backing and gently remove tablet; do not push oral disintegrating tablet through foil backing; administer immediately on tongue and oral disintegrating tablet dissolves in seconds; then swallow with saliva; liquid not required.    Admin. Amount: 1 tablet (1 × 4 mg tablet)  Dispense Loc: [facility] ADS 7D  Administrations Remainin  POC: PACU/Phase II              Or  ondansetron (ZOFRAN) injection 4 mg  Dose: 4 mg  Freq: EVERY 30 MIN PRN Route: IV  PRN Reasons: nausea,vomiting  Start: 18   Admin Instructions: MAX total dose = 8 mg, including OR dosing. This is step 1 of nausea and vomiting management.  If not resolved in 15 minutes, then go to step 2 [prochlorperazine (COMPAZINE) if ordered].  Irritant. For ordered doses up to 4 mg, give IV Push undiluted over 2-5 minutes.    Admin. Amount: 4 mg = 2 mL Conc: 4 mg/2 mL  Dispense Loc: [facility] ADS 7D  Infused Over: 2-5 Minutes  Administrations Remainin  Volume: 2 mL  POC: PACU/Phase II               ORAL Pain Medications -  may administer as ordered by surgeon for take home use  Freq: CONTINUOUS PRN Route: XX  Start: 18   Admin Instructions: May administer oral pain medications as ordered by surgeon for take home use.  Discontinue IV pain medication prior to administration of oral pain medication.    Dispense Loc: [facility]  POC: PACU/Phase II               pantoprazole (PROTONIX) EC tablet 40 mg  Dose: 40 mg  Freq: EVERY MORNING BEFORE BREAKFAST Route: PO  Start: 1830   Admin  Instructions: DO NOT CRUSH.    Admin. Amount: 1 tablet (1 × 40 mg tablet)  Last Admin: 07/15/18 0919  Dispense Loc: [facility] ADS 7D          1036 (40 mg)-Given        0919 (40 mg)-Given           polyethylene glycol (MIRALAX/GLYCOLAX) Packet 17 g  Dose: 17 g  Freq: 2 TIMES DAILY Route: PO  Start: 07/14/18 0930   Admin Instructions: 1 Packet = 17 grams. Mixed prescribed dose in 8 ounces of water. Follow with 8 oz. of water.    Admin. Amount: 17 g  Last Admin: 07/15/18 0919  Dispense Loc: [facility] ADS 7D          1037 (17 g)-Given       (2031)-Not Given [C]        0919 (17 g)-Given       [ ] 2000           sennosides (SENOKOT) tablet 2 tablet  Dose: 2 tablet  Freq: 2 TIMES DAILY Route: PO  Start: 07/14/18 0930   Admin. Amount: 2 tablet  Dispense Loc: [facility] ADS 7D          (1037)-Not Given       (2055)-Not Given        (0920)-Not Given       [ ] 2000           simethicone 133mg/2mL oral suspension  Freq: PRN  Start: 07/14/18 0259   Last Admin: 07/14/18 0259  POC: Intra-procedure          0259 (4 mL)-Given [C]            tamsulosin (FLOMAX) capsule 0.4 mg  Dose: 0.4 mg  Freq: DAILY Route: PO  Start: 07/14/18 0930   Admin Instructions: Administer 30 minutes after the same meal each day.  Capsules should be swallowed whole; do not crush chew or open.    Admin. Amount: 1 capsule (1 × 0.4 mg capsule)  Last Admin: 07/15/18 0920  Dispense Loc: [facility] ADS 7D          1036 (0.4 mg)-Given        0920 (0.4 mg)-Given           traZODone (DESYREL) quarter-tab 12.5 mg  Dose: 12.5 mg  Freq: AT BEDTIME Route: PO  Start: 07/14/18 2200   Admin. Amount: 1 quarter-tab (1 × 12.5 mg quarter-tab)  Dispense Loc: [facility]          (3573)-Not Given        [ ] 2200          Completed Medications  Medications 07/09/18 07/10/18 07/11/18 07/12/18 07/13/18 07/14/18 07/15/18         Dose: 78 mL  Freq: ONCE Route: IV  Start: 07/13/18 2141   End: 07/13/18 2151   Admin. Amount: 78 mL  Last Admin: 07/13/18 2151  Dispense Loc: [facility]  Floor Stock  Administrations Remainin  Volume: 78 mL         2151 (78 mL)-Given               Dose: 12.5 mg  Freq: ONCE Route: PO  Start: 18 1015   End: 18   Admin Instructions: Hold for SBP < 110 or HR < 60    Admin. Amount: 1 half-tab (1 × 12.5 mg half-tab)  Last Admin: 18  Dispense Loc: [facility] ADS 7D  Administrations Remainin          1036 (12.5 mg)-Given              Dose: 40 mEq  Freq: ONCE Route: PO  Start: 1845   End: 18   Admin Instructions: DO NOT CRUSH.    Admin. Amount: 4 tablet (4 × 10 mEq tablet)  Last Admin: 18  Dispense Loc: [facility] ADS 7D  Administrations Remainin          1036 (40 mEq)-Given              Dose: 74 mL  Freq: ONCE Route: IV  Start: 18   End: 18   Admin. Amount: 74 mL  Last Admin: 18  Dispense Loc: [facility] RAD Floor Stock  Administrations Remainin  Volume: 80 mL         2151 (74 mL)-Given            Discontinued Medications  Medications 07/09/18 07/10/18 07/11/18 07/12/18 07/13/18 07/14/18 07/15/18         Dose: 1 suppository  Freq: ONCE Route: RE  Start: 18   End: 18   Admin. Amount: 1 suppository  Administrations Remainin                 5-Med Discontinued          Dose: 2.5-5 mg  Freq: EVERY 10 MIN PRN Route: IV  PRN Reason: high blood pressure  PRN Comment: for Systolic Blood Pressure greater than 140 mmHg and Heart Rate less than 100 bpm.    Start: 18   End: 18 101   Admin Instructions: Max cumulative dose = 20 mg. For PACU USE ONLY  For ordered doses up to 40 mg, give IV Push undiluted over 1 minute.    Admin. Amount: 2.5-5 mg = 0.125-0.25 mL Conc: 20 mg/mL  Dispense Loc: [facility] ADS 7D  Volume: 0.25 mL  POC: PACU          1012-Med Discontinued          Dose: 25 mg  Freq: EVERY 6 HOURS PRN Route: PO  PRN Reason: anxiety  Start: 18   End: 18   Admin. Amount: 25 mg = 12.5 mL Conc: 2 mg/mL  Volume: 12.5 mL           1352-Med Discontinued          Dose: 12.5 mg  Freq: 2 TIMES DAILY Route: PO  Start: 07/14/18 0945   End: 07/14/18 1011   Admin Instructions: Hold for SBP < 110 or HR < 60    Admin. Amount: 1 half-tab (1 × 12.5 mg half-tab)  Dispense Loc: [facility] ADS 7D          1011-Med Discontinued  (1019)-Not Given              Rate: 100 mL/hr   Freq: CONTINUOUS Route: IV  Start: 07/14/18 0343   End: 07/14/18 0800   Last Admin: 07/14/18 0535  Dispense Loc: [facility] Floor Stock  Volume: 50 mL          0359 ( )-New Bag       0535 ( )-Rate/Dose Verify       0800-Med Discontinued          Rate: 75 mL/hr   Freq: CONTINUOUS Route: IV  Start: 07/13/18 2059   End: 07/14/18 0536   Dispense Loc: [facility] Floor Stock  Volume: 50 mL                 0536-Med Discontinued     Medications 07/09/18 07/10/18 07/11/18 07/12/18 07/13/18 07/14/18 07/15/18               INTERAGENCY TRANSFER FORM - NOTES (H&P, Discharge Summary, Consults, Procedures, Therapies)   7/13/2018                       UNIT 7D [facility] BANK: [phone]               History & Physicals      H&P by Diana Buck MD at 7/14/2018 12:47 AM     Author:  Diana Buck MD Service:  General Medicine Author Type:  Resident    Filed:  7/14/2018  6:29 AM Date of Service:  7/14/2018 12:47 AM Creation Time:  7/14/2018 12:31 AM    Status:  Cosign Needed :  Diana Buck MD (Resident)    Cosign Required:  Yes             [facility], [place]    Internal Medicine History and Physical - [facility] Service       Date of Admission:  7/13/2018[DH1.1]    Chief Complaint[DH1.2]   Constipation, ABD pain    Hx obtained from daughter Rachele and EMR[DH1.3]    History of Present Illness[DH1.2]   Hardy Acevedo is a 89 year old male  who has a history of dementia, TIAS, hiatal hernia, gastric ulcer, BPH, bladder cnacer, and is admitted for recurrent sigmoid volvulous.     Patient feels unwell. Hx of 2 prior bowel obstructions, most recent in[DH1.1]  "M[DH1.3]ay 2016. . Abdominal pain, distention[DH1.1] for unknown period of time, lives at [facility], per daughter they did not report any known symptoms[DH1.3].[DH1.1]  Ongoing[DH1.3] constipation despite metamucil. Had dulcolax and milk of magnesia on 7/13[DH1.1] with very small BM.    Had episode of loss of consciousness in ED. Per daughter, he has episodes of \"zoning out\". Was told likely 2/2 dementia or possible TIAs. Family does not want further work up and instructs nursing home not to bring patient to ED for these episodes.[DH1.3]    ABD XR at nursing home showed \"unchanged dilated air-filled loops of bowel consistent with ileus.\"  ABD CT  1. Sigmoid volvulus with associated pan colonic distention. No  evidence of intra-abdominal free air, pneumatosis, or portal venous  gas.  2. Left inguinal hernia containing nonobstructed loops of small bowel.  3. Small amount of pelvic free fluid.   4. Small hiatal hernia.[DH1.1]    Was seen by GI, they successfully reduced volvulus.[DH1.3]     DNR/DNI, has polst     [DH1.1]  Review of Systems[DH1.2]   The 10 point Review of Systems is negative other than noted in the HPI or here.[DH1.3]     Past Medical History[DH1.2]    I have reviewed this patient's medical history and updated it with pertinent information if needed.[DH1.3]   Past Medical History:   Diagnosis Date     Bladder cancer (H)      BPH (benign prostatic hyperplasia)      Dementia      TIA (transient ischemic attack)      Volvulus (H)     recurrent        Past Surgical History[DH1.2]   I have reviewed this patient's surgical history and updated it with pertinent information if needed.[DH1.3]  Sigmoid volvulous repair[DH1.1] endoscopically  Ulcer repair surgery 55 years ago[DH1.3]    Social History[DH1.2]   Lives At [facility][DH1.1]    Family History[DH1.2]   Family history reviewed with patient and is noncontributory.[DH1.3]    Prior to Admission Medications   None     Allergies   Allergies "   Allergen Reactions     Atorvastatin      Penicillins        Physical Exam[DH1.2]   Vital Signs:[DH1.1] Temp: 97  F (36.1  C) Temp src: Axillary BP: 145/67 Pulse: 105 Heart Rate: 109 Resp: 18 SpO2: 96 % O2 Device: None (Room air) Oxygen Delivery: 2 LPM[DH1.2]  Weight:[DH1.1] 157 lbs 0 oz[DH1.2]    General Appearance:[DH1.1] lying in bed comfortably[DH1.3]  Eyes:[DH1.1] EOM intact, no scleral icterus[DH1.3]  HEENT:[DH1.1] MMM[DH1.3]  Respiratory:[DH1.1] CTAB[DH1.3]  Cardiovascular:[DH1.1] RRR no murmur[DH1.3]  GI:[DH1.1] distended, soft, mildly tender to palpation[DH1.3]  Genitourinary:[DH1.1] no suprapubic tenderness[DH1.3]  Skin:[DH1.1] Patient declined skin exam, prefers dark room[DH1.3]   Musculoskeletal:[DH1.1] normal bulk and tone[DH1.3]  Neurologic:[DH1.1] alert to self and year, not to situation or place[DH1.3]  Psychiatric:[DH1.1] pleasant, appropriate affect and behavior, easily redirectable[DH1.3]    Assessment & Plan[DH1.2]   Hardy Acevedo is a 89 year old male  who has a history of dementia, TIAS, hiatal hernia, gastric ulcer, BPH, bladder cnacer, and is admitted for recurrent sigmoid volvulous.[DH1.1]       #Voluvlus- resolved  Hemodynamically stable. LA normal. Successful endoscopic reduction. High risk for reoccurrence.[DH1.3]  -Hold Plavix.   -GI consult[DH1.1]  -Recs;   -Clear liquid diet   -AXR x2   -miralax Daily 1-2 BM daily   -ambulate   -avoid narcotics   -monitor stool output   -high risk for reoccurrence, consider sigmoidectomy.    #Dementia  Oriented to self and year, not place or situation. At mental baseline per daughter. Has noticed worsening of dementia following recent nursing home move. Also is hard of hearing. Daughter reports somewhat frequent mild falls and gets out of bed frequently.  -Delirium precautions  -Fall precautions    #[DH1.3] Pain Assessment:[DH1.1]  Current Pain Score 7/14/2018   Patient currently in pain? denies[DH1.2]   Hardy s pain level was assessed and he  currently denies pain.[DH1.3]      Diet:[DH1.1] Clear Liquid Diet[DH1.2]  Fluids:[DH1.1] Ns 100ml/hr d/c order at 0800 to reasess[DH1.3]  DVT Prophylaxis:[DH1.1] mechanical, post procedural[DH1.3]  Code Status:[DH1.1] DNR / DNI[DH1.3]    Disposition Plan[DH1.2]   Expected discharge:[DH1.1] 2 - 3 days[DH1.3]; recommended to[DH1.1] prior living arrangement[DH1.3] once[DH1.1] resolution of bowel obstruction[DH1.3].     Entered:[DH1.1] Diana Buck 07/14/2018[DH1.2],[DH1.1] 6:27 AM[DH1.2]   Information in the above section will display in the discharge planner report.[DH1.1]    To be staffed in AM[DH1.3]    Diana Buck[DH1.2]  [facility] [place][DH1.1] Night[DH1.3]    [facility]   Pager:[DH1.1] [phone][DH1.3]  Please see sticky note for cross cover information[DH1.1]      Data   Data     Recent Labs  Lab 07/14/18  0056 07/13/18 2025   WBC  --  6.4   HGB 13.4 13.6   MCV  --  98   PLT  --  191   INR  --  1.14   NA  --  141   POTASSIUM  --  3.4   CHLORIDE  --  107   CO2  --  23   BUN  --  26   CR  --  0.78   ANIONGAP  --  11   BRAD  --  9.2   GLC  --  144*   ALBUMIN  --  4.3   PROTTOTAL  --  7.5   BILITOTAL  --  0.7   ALKPHOS  --  56   ALT  --  16   AST  --  14   LIPASE  --  353   TROPI 0.068* 0.037     Recent Results (from the past 24 hour(s))   CT Abdomen Pelvis w Contrast    Narrative    1. Sigmoid volvulus with associated pan colonic distention. No  evidence of intra-abdominal free air, pneumatosis, or portal venous  gas.  2. Left inguinal hernia containing nonobstructed loops of small bowel.  3. Small amount of pelvic free fluid.   4. Small hiatal hernia.    [Result: Sigmoid volvulus]    Finding was identified on 7/13/2018 10:04 PM.     Dr. Kumar was contacted by Dr. Dyer at 7/13/2018 10:10 PM and  verbalized understanding of the urgent finding.    XR Abdomen Port 1 View    Narrative    Persistent gaseous distention of the colon although less than on  previous CT. Recommend short-term follow-up  radiographs.[DH1.2]                  Revision History        User Key Date/Time User Provider Type Action    > DH1.2 7/14/2018  6:29 AM Diana Buck MD Resident Sign     DH1.3 7/14/2018  6:07 AM Diana Buck MD Resident      DH1.1 7/14/2018 12:31 AM Diana Buck MD Resident                      Discharge Summaries      Discharge Summaries by Lyndon Hua MD at 7/15/2018  9:56 AM     Author:  Lyndon Hua MD Service:  Hospitalist Author Type:  Physician    Filed:  7/15/2018 10:13 AM Date of Service:  7/15/2018  9:56 AM Creation Time:  7/15/2018  9:56 AM    Status:  Signed :  Lyndon Hua MD (Physician)             [facility]  Discharge Summary    Hardy Acevedo MRN# [id number]   YOB: 1923 Age: 89 year old     Date of Admission:  7/13/2018  Date of Discharge:[KK1.1]  7/15/2018[KK1.2]  Admitting Physician:  Jovanny Ramos MD  Discharge Physician:  Lyndon Hua MD  Discharging Service:  Internal Medicine     Primary Provider: System, Provider Not In            Discharge Diagnosis:     Primary Discharge Diagnosis:      # Sigmoid Voluvlus, ho Recurrent volvulus every 2-3 years. This is 3rd epidose. SP sigmoidoscopy and resolution of it on scope  # Syncope while on the commode, likely vasovagal  # Demand ischemia, family refused further evaluation  # Agitation associated with ho #Dementia    PMH:     History of dementia   TIAs  Hiatal hernia  HO gastric ulcer SP some kind of surgery 50 years ago   BPH   Bladder cancer   Ho Melanoma    Past Medical History:   Diagnosis Date     Bladder cancer (H)      BPH (benign prostatic hyperplasia)      Dementia      TIA (transient ischemic attack)      Volvulus (H)     recurrent                  Discharge Medications:     Current Discharge Medication List      START taking these medications    Details   acetaminophen (TYLENOL) 325 MG tablet Take 2 tablets (650 mg) by mouth every 6 hours as  needed for mild pain  Qty: 100 tablet, Refills: 0    Associated Diagnoses: Sigmoid volvulus (H)      clopidogrel (PLAVIX) 75 MG tablet Take 1 tablet (75 mg) by mouth daily  Qty: 30 tablet    Associated Diagnoses: History of TIA (transient ischemic attack) and stroke      hydrOXYzine (ATARAX) 10 MG/5ML syrup Take 12.5 mLs (25 mg) by mouth every 6 hours as needed for anxiety (agitation)  Qty: 450 mL, Refills: 0    Associated Diagnoses: Agitation      magnesium hydroxide (MILK OF MAGNESIA) 400 MG/5ML suspension Take 15 mLs by mouth daily as needed for constipation  Qty: 105 mL, Refills: 0    Associated Diagnoses: Sigmoid volvulus (H)      melatonin 1 MG TABS tablet Take 1 tablet (1 mg) by mouth nightly as needed for sleep    Associated Diagnoses: Insomnia, unspecified type      pantoprazole (PROTONIX) 40 MG EC tablet Take 1 tablet (40 mg) by mouth every morning (before breakfast)  Qty: 30 tablet    Associated Diagnoses: Gastroesophageal reflux disease without esophagitis      polyethylene glycol (MIRALAX/GLYCOLAX) Packet Take 17 g by mouth 2 times daily  Qty: 7 packet    Associated Diagnoses: Sigmoid volvulus (H)      sennosides (SENOKOT) 8.6 MG tablet Take 2 tablets by mouth 2 times daily  Qty: 120 each, Refills: 0    Associated Diagnoses: Sigmoid volvulus (H)      tamsulosin (FLOMAX) 0.4 MG capsule Take 1 capsule (0.4 mg) by mouth daily  Qty: 60 capsule, Refills: 0    Associated Diagnoses: Benign prostatic hyperplasia with lower urinary tract symptoms, symptom details unspecified      traZODone (DESYREL) 5 mg/ml SUSP Take 2.5 mLs (12.5 mg) by mouth At Bedtime    Associated Diagnoses: Agitation; Insomnia, unspecified type                  Hospital Course:     Hardy Acevedo is a 89 year old male  who has a history of dementia, TIAs, hiatal hernia, HO gastric ulcer SP some kind of surgery 50 years ago, BPH, bladder cancer, ho Melanoma and is admitted for abdominal pain, distention, no BM, Found to have  recurrent  sigmoid volvulous on CT abdomen and pelvis.       # Recurrent Voluvlus-  Now resolved on sigmoidoscopy    SP Successful endoscopic reduction by GI on 7/13. He has High risk for reoccurrence. They recummended getting surgery evaluation. But family does not want anything aggressive to be done at this time. He gets recurrance only every 2-3 years. So its a reasonable approch to manage it conservatively at his age. He is eating and drinking fine. Has goog BMs after wards. We watched him overnight. No recurrence, no abdominal pain. Passing gas and moving bowels.     He needs to stay on a good bowel regimen to have regular BMs. Continue miralax, lian-s, and prn MOM on top of that as needed to regulate his BMs. Can use metamucil too in addition. He should move his bowels every day or at least every  Other day.      #Dementia: Oriented to self and year, not place or situation. At mental baseline per daughter. Has noticed worsening of dementia following recent nursing home move. Also is hard of hearing. Daughter reports somewhat frequent mild falls and gets out of bed frequently. Continue trazodone at Hs for sleeping, along with melatonin. I would use some atarax to control agitation on prn basis. I have added that to this medication list. Scheduled buspar and neurontin could be good options going forward for agitation in a dementia patient. Recommend FU [facility] geriatric psychiatry.      # Rising Troponin, Tachycardia: LIkely Demand ishcemia in elderly individual. No documented ho MI. Denies acute chest pain. EKG unchanged from yesterday, no old EKG for comparison. Restart plavix. Spoke with Daughter Rachele. She does not want any further investigation to be done or any other treatment to be done for any other things  Other than the current issue of volvulus.      # Syncope  While in ER: Could be vasovagal. Now troponin going up. We wanted further evaluation but famiily declined any further assessment.      # Ho prostate Cancer,  BPH: Continue flomax.   # Hiatal Hernia and ho GERD. Continue protonix.                Discharge Disposition:   Discharged to home  Memory care unit           Condition on Discharge:   Discharge condition: Stable   Code status on discharge: DNR / DNI           Procedures:     SP flexible sigmoidoscopy to reduce the volvulus          Consultations:   Consultation during this admission received from GI.               Final Day of Progress before Discharge:       Physical Exam:  Blood pressure 126/68, pulse 85, temperature 97.5  F (36.4  C), temperature source Axillary, resp. rate 18, weight 71.7 kg (158 lb), SpO2 95 %.  GENERAL: Alert and oriented x 3. NAD.   HEENT: Anicteric sclera. PERRL. Mucous membranes moist and without lesions.   RESPIRATORY: Effort normal. Lungs CTAB with no wheezing, rales, rhonchi.   GI: mild distention of abdomen. NOn tender. He had three BMs today .      Data:  All laboratory data reviewed             Significant Results:[KK1.1]     Results for orders placed or performed during the hospital encounter of 07/13/18   FLEXIBLE SIGMOIDOSCOPY   Result Value Ref Range    [facility]  [address] [place] [address] [phone]     Endoscopy Department  _______________________________________________________________________________  Patient Name: Hardy Acevedo           Procedure Date: 7/14/2018 2:47 AM  MRN: [id number]                       Account Number: [id number]  YOB: 1923               Admit Type: Inpatient  Age: 89                                Gender: Male  Note Status: Finalized                Attending MD: Marco A Cheung MD  Total Sedation Time:                    _______________________________________________________________________________     Procedure:           Flexible Sigmoidoscopy  Indications:         Volvulus  Providers:           Marco A Cheung MD, Tremaine Crane MD  Patient Profile:     89 y.o male with  PMH significant for recurrent sigmoid                        volvulus requiring endoscopic reduction last one in May                        2016 who presented with abdomina l pain and significant                        distention with CT A/P in the ED showing sigmoid                        volvulus. Flex sigmoidoscopy for reduction.  Referring MD:          Medicines:           General Anesthesia  Complications:       No immediate complications.  _______________________________________________________________________________  Procedure:           Pre-Anesthesia Assessment:                       - Prior to the procedure, a History and Physical was                        performed, and patient medications and allergies were                        reviewed. The patient is unable to give consent                        secondary to the patient being legally incompetent to                        consent. The risks and benefits of the procedure and the                        sedation options and risks were discussed with the                        patient's daughter. All questions were answered and                        informed consent was obtained. Patient sunitha ntification                        and proposed procedure were verified by the physician,                        the nurse and the anesthesiologist in the procedure                        room. Mental Status Examination: alert but confused.                        Airway Examination: Mallampati Class II (the uvula but                        not tonsillar pillars visualized). Respiratory                        Examination: clear to auscultation. CV Examination:                        normal. Prophylactic Antibiotics: The patient does not                        require prophylactic antibiotics. Prior Anticoagulants:                        The patient has taken Plavix (clopidogrel), last dose                        was day of procedure. ASA Grade Assessment: III - A                         patient with severe systemic disease. After reviewing                        the risks and benefits, the patient was deemed in                        satisfactory condition to undergo the procedure. The                         anesthesia plan was to use general anesthesia.                        Immediately prior to administration of medications, the                        patient was re-assessed for adequacy to receive                        sedatives. The heart rate, respiratory rate, oxygen                        saturations, blood pressure, adequacy of pulmonary                        ventilation, and response to care were monitored                        throughout the procedure. The physical status of the                        patient was re-assessed after the procedure.                       After obtaining informed consent, the scope was passed                        under direct vision. The Colonoscope was introduced                        through the anus and advanced to the splenic flexure.                        After obtaining informed consent, the scope was passed                        under direct vision.The flexible sigmoidoscopy was                        accomplishe d without difficulty. The patient tolerated                        the procedure well.                                                                                   Findings:       The perianal and digital rectal examinations were normal.       A volvulus, with viable appearing mucosa, was found in the sigmoid        colon. Decompression of the volvulus was attempted and was successful,        with complete decompression achieved. The abdomen was soft without        distension at the end of the procedure.                                                                                   Impression:          - Volvulus. Successful complete decompression achieved.                       - No specimens  collected.  Recommendation:      - Clear liquid diet and ADAT tomorrow in am                       - Please obtain abdominal XR to confirm decompression                       - Conitnue bowel regimen                       - Return patient to [facility] for [facility] care.                                                                                      ____________________  Marco A Cheung MD  7/14/2018 3:30:51 AM  I was physically present for the entire viewing portion of the exam.  __________________________  Signature of teaching physician  [id number] Cheung MD    _____________________________  Tremaine Crane MD  Number of Addenda: 0    Note Initiated On: 7/14/2018 2:47 AM  Scope In:  Scope Out:     CT Abdomen Pelvis w Contrast    Narrative    EXAMINATION: CT ABDOMEN PELVIS W CONTRAST, 7/13/2018 10:00 PM    TECHNIQUE: Helical CT images from the lung bases through the symphysis  pubis were obtained with IV contrast. Contrast dose: 78cc isovue 370    COMPARISON: None available. Outside dictation 9/23/2016.    HISTORY: ? bowel obstruction, abd distension, pain    FINDINGS:    Abdomen and pelvis:   There is abnormal spiraling of the superior rectum with twisting of  the sigmoid colon. There is marked distention of the colon, including  the twisted sigmoid colon, measuring up to 10.6 cm in diameter. The  small bowel is not significantly distended. Loops of small bowel in  the left lower quadrant extend into a left inguinal hernia. The  distended colon has significant mass effect on the stomach. No  evidence of pneumatosis, portal venous gas, or intra-abdominal free  air.    1.5 cm enhancing lesion in hepatic segment 48, at the dome, series 5  image 38, likely representing a hemangioma. Otherwise, unremarkable  liver parenchyma. Gallbladder, spleen, partially atrophic pancreas,  and adrenal glands appear normal. Small splenule anterior to the  splenic hilum. Mild age-related dilation of common  bile duct.  Scattered too small to characterize hypodensities in the kidneys  bilaterally and a simple renal cyst in the anterior right kidney. No  hydronephrosis. Small amount of pelvic free fluid. The abdominal aorta  is tortuous, coursing to the right of the IVC. The major abdominal  vasculature is patent. No lymphadenopathy in the abdomen or pelvis.    Lung bases:   The heart is nonenlarged. Extensive atherosclerotic calcifications. No  pericardial effusion. Small to moderate sliding hiatal hernia.  Bibasilar atelectasis.    Bones and soft tissues:   Straightening of the normal lumbar lordosis with multilevel  spondylosis. No acute or worrisome osseous lesions. Old lateral right  rib fractures.        Impression    IMPRESSION:   1. Sigmoid volvulus with associated pan colonic distention. No  evidence of intra-abdominal free air, pneumatosis, or portal venous  gas.  2. Left inguinal hernia containing nonobstructed loops of small bowel.  3. Small amount of pelvic free fluid.   4. Small to moderate sliding hiatal hernia.      [Result: Sigmoid volvulus]    Finding was identified on 7/13/2018 10:04 PM.     Dr. Kumar was contacted by Dr. Dyer at 7/13/2018 10:10 PM and  verbalized understanding of the urgent finding.     I have personally reviewed the examination and initial interpretation  and I agree with the findings.    MARYELLEN DEWEY MD   XR Abdomen Port 1 View    Narrative    Exam: XR ABDOMEN PORT 1 VW, 7/14/2018 5:27 AM    Indication: Sigmoid decompression    Comparison: 7/13/2018    Findings:   Portable AP views of the abdomen were obtained. Contrast from the  previous CT opacifies the renal collecting systems and urinary  bladder. Possibly mildly decreased degree of gaseous distention. No  pneumatosis identified. Lumbar scoliosis with associated degenerative  changes.      Impression    Impression:   Persistent gaseous distention of the colon although less than on  previous CT. Recommend short-term  follow-up radiographs.  I have personally reviewed the examination and initial interpretation  and I agree with the findings.    MARYELLEN DEWEY MD   XR Abdomen 2 Views    Narrative    XR ABDOMEN 2 VW  7/14/2018 11:59 AM      HISTORY: monitor for volvulus;     COMPARISON: 7/13/2018    FINDINGS: Upper and supine frontal views of the abdomen. Persistent  gaseous distention of the colon, decreased from the prior study. No  definite free air. No portal venous gas. Heart size is normal. Lung  bases are relatively clear. Contrast material from prior study within  the urinary bladder.      Impression    IMPRESSION: Persistent gaseous distention of the colon, decreased from  the prior study.    I have personally reviewed the examination and initial interpretation  and I agree with the findings.    MARYELLEN DEWEY MD   CBC with platelets differential   Result Value Ref Range    WBC 6.4 4.0 - 11.0 10e9/L    RBC Count 4.21 (L) 4.4 - 5.9 10e12/L    Hemoglobin 13.6 13.3 - 17.7 g/dL    Hematocrit 41.3 40.0 - 53.0 %    MCV 98 78 - 100 fl    MCH 32.3 26.5 - 33.0 pg    MCHC 32.9 31.5 - 36.5 g/dL    RDW 15.4 (H) 10.0 - 15.0 %    Platelet Count 191 150 - 450 10e9/L    Diff Method Automated Method     % Neutrophils 82.7 %    % Lymphocytes 13.2 %    % Monocytes 3.9 %    % Eosinophils 0.0 %    % Basophils 0.0 %    % Immature Granulocytes 0.2 %    Nucleated RBCs 0 0 /100    Absolute Neutrophil 5.3 1.6 - 8.3 10e9/L    Absolute Lymphocytes 0.9 0.8 - 5.3 10e9/L    Absolute Monocytes 0.3 0.0 - 1.3 10e9/L    Absolute Eosinophils 0.0 0.0 - 0.7 10e9/L    Absolute Basophils 0.0 0.0 - 0.2 10e9/L    Abs Immature Granulocytes 0.0 0 - 0.4 10e9/L    Absolute Nucleated RBC 0.0    INR   Result Value Ref Range    INR 1.14 0.86 - 1.14   Comprehensive metabolic panel   Result Value Ref Range    Sodium 141 133 - 144 mmol/L    Potassium 3.4 3.4 - 5.3 mmol/L    Chloride 107 94 - 109 mmol/L    Carbon Dioxide 23 20 - 32 mmol/L    Anion Gap 11 3 - 14  mmol/L    Glucose 144 (H) 70 - 99 mg/dL    Urea Nitrogen 26 7 - 30 mg/dL    Creatinine 0.78 0.66 - 1.25 mg/dL    GFR Estimate >90 >60 mL/min/1.7m2    GFR Estimate If Black >90 >60 mL/min/1.7m2    Calcium 9.2 8.5 - 10.1 mg/dL    Bilirubin Total 0.7 0.2 - 1.3 mg/dL    Albumin 4.3 3.4 - 5.0 g/dL    Protein Total 7.5 6.8 - 8.8 g/dL    Alkaline Phosphatase 56 40 - 150 U/L    ALT 16 0 - 70 U/L    AST 14 0 - 45 U/L   Lipase   Result Value Ref Range    Lipase 353 73 - 393 U/L   Lactic acid whole blood   Result Value Ref Range    Lactic Acid 1.5 0.7 - 2.0 mmol/L   Troponin I   Result Value Ref Range    Troponin I ES 0.037 0.000 - 0.045 ug/L   TSH with free T4 reflex   Result Value Ref Range    TSH 1.92 0.40 - 4.00 mU/L   UA with Microscopic   Result Value Ref Range    Color Urine Yellow     Appearance Urine Clear     Glucose Urine Negative NEG^Negative mg/dL    Bilirubin Urine Negative NEG^Negative    Ketones Urine 10 (A) NEG^Negative mg/dL    Specific Gravity Urine 1.029 1.003 - 1.035    Blood Urine Negative NEG^Negative    pH Urine 5.5 5.0 - 7.0 pH    Protein Albumin Urine 30 (A) NEG^Negative mg/dL    Urobilinogen mg/dL Normal 0.0 - 2.0 mg/dL    Nitrite Urine Negative NEG^Negative    Leukocyte Esterase Urine Negative NEG^Negative    Source Unspecified Urine     WBC Urine 1 0 - 5 /HPF    RBC Urine 1 0 - 2 /HPF    Squamous Epithelial /HPF Urine <1 0 - 1 /HPF    Transitional Epi <1 0 - 1 /HPF    Mucous Urine Present (A) NEG^Negative /LPF    Hyaline Casts 1 0 - 2 /LPF   Troponin I   Result Value Ref Range    Troponin I ES 0.068 (H) 0.000 - 0.045 ug/L   Hemoglobin   Result Value Ref Range    Hemoglobin 13.4 13.3 - 17.7 g/dL   Troponin I   Result Value Ref Range    Troponin I ES 0.107 (H) 0.000 - 0.045 ug/L   CBC with platelets   Result Value Ref Range    WBC 14.5 (H) 4.0 - 11.0 10e9/L    RBC Count 4.01 (L) 4.4 - 5.9 10e12/L    Hemoglobin 13.0 (L) 13.3 - 17.7 g/dL    Hematocrit 39.4 (L) 40.0 - 53.0 %    MCV 98 78 - 100 fl    MCH  32.4 26.5 - 33.0 pg    MCHC 33.0 31.5 - 36.5 g/dL    RDW 15.5 (H) 10.0 - 15.0 %    Platelet Count 178 150 - 450 10e9/L   Comprehensive metabolic panel   Result Value Ref Range    Sodium 143 133 - 144 mmol/L    Potassium 3.2 (L) 3.4 - 5.3 mmol/L    Chloride 108 94 - 109 mmol/L    Carbon Dioxide 23 20 - 32 mmol/L    Anion Gap 12 3 - 14 mmol/L    Glucose 131 (H) 70 - 99 mg/dL    Urea Nitrogen 28 7 - 30 mg/dL    Creatinine 0.86 0.66 - 1.25 mg/dL    GFR Estimate 83 >60 mL/min/1.7m2    GFR Estimate If Black >90 >60 mL/min/1.7m2    Calcium 9.1 8.5 - 10.1 mg/dL    Bilirubin Total 0.6 0.2 - 1.3 mg/dL    Albumin 4.0 3.4 - 5.0 g/dL    Protein Total 7.3 6.8 - 8.8 g/dL    Alkaline Phosphatase 52 40 - 150 U/L    ALT 17 0 - 70 U/L    AST 22 0 - 45 U/L   Basic metabolic panel   Result Value Ref Range    Sodium 145 (H) 133 - 144 mmol/L    Potassium 3.6 3.4 - 5.3 mmol/L    Chloride 114 (H) 94 - 109 mmol/L    Carbon Dioxide 23 20 - 32 mmol/L    Anion Gap 8 3 - 14 mmol/L    Glucose 92 70 - 99 mg/dL    Urea Nitrogen 30 7 - 30 mg/dL    Creatinine 0.84 0.66 - 1.25 mg/dL    GFR Estimate 85 >60 mL/min/1.7m2    GFR Estimate If Black >90 >60 mL/min/1.7m2    Calcium 8.3 (L) 8.5 - 10.1 mg/dL   EKG 12-lead, tracing only   Result Value Ref Range    Interpretation ECG Click View Image link to view waveform and result    EKG 12-lead, tracing only   Result Value Ref Range    Interpretation ECG Click View Image link to view waveform and result    EKG 12-lead, complete   Result Value Ref Range    Interpretation ECG Click View Image link to view waveform and result    GI LUMINAL ADULT IP CONSULT: Patient to be seen: Routine within 24 hrs; Call back #: [phone]; sigmoid volvulus; Consultant may enter orders: Yes    Narrative    Tremaine Traylor MD     7/14/2018  5:41 AM  Brief GI note      Consult note already placed in EPIC       Tremaine Traylor  GI Fellow[KK1.2]       Recent Results (from the past 48 hour(s))   CT Abdomen Pelvis w Contrast     Narrative    EXAMINATION: CT ABDOMEN PELVIS W CONTRAST, 7/13/2018 10:00 PM    TECHNIQUE: Helical CT images from the lung bases through the symphysis  pubis were obtained with IV contrast. Contrast dose: 78cc isovue 370    COMPARISON: None available. Outside dictation 9/23/2016.    HISTORY: ? bowel obstruction, abd distension, pain    FINDINGS:    Abdomen and pelvis:   There is abnormal spiraling of the superior rectum with twisting of  the sigmoid colon. There is marked distention of the colon, including  the twisted sigmoid colon, measuring up to 10.6 cm in diameter. The  small bowel is not significantly distended. Loops of small bowel in  the left lower quadrant extend into a left inguinal hernia. The  distended colon has significant mass effect on the stomach. No  evidence of pneumatosis, portal venous gas, or intra-abdominal free  air.    1.5 cm enhancing lesion in hepatic segment 48, at the dome, series 5  image 38, likely representing a hemangioma. Otherwise, unremarkable  liver parenchyma. Gallbladder, spleen, partially atrophic pancreas,  and adrenal glands appear normal. Small splenule anterior to the  splenic hilum. Mild age-related dilation of common bile duct.  Scattered too small to characterize hypodensities in the kidneys  bilaterally and a simple renal cyst in the anterior right kidney. No  hydronephrosis. Small amount of pelvic free fluid. The abdominal aorta  is tortuous, coursing to the right of the IVC. The major abdominal  vasculature is patent. No lymphadenopathy in the abdomen or pelvis.    Lung bases:   The heart is nonenlarged. Extensive atherosclerotic calcifications. No  pericardial effusion. Small to moderate sliding hiatal hernia.  Bibasilar atelectasis.    Bones and soft tissues:   Straightening of the normal lumbar lordosis with multilevel  spondylosis. No acute or worrisome osseous lesions. Old lateral right  rib fractures.        Impression    IMPRESSION:   1. Sigmoid volvulus  with associated pan colonic distention. No  evidence of intra-abdominal free air, pneumatosis, or portal venous  gas.  2. Left inguinal hernia containing nonobstructed loops of small bowel.  3. Small amount of pelvic free fluid.   4. Small to moderate sliding hiatal hernia.      [Result: Sigmoid volvulus]    Finding was identified on 7/13/2018 10:04 PM.     Dr. Kumar was contacted by Dr. Dyer at 7/13/2018 10:10 PM and  verbalized understanding of the urgent finding.     I have personally reviewed the examination and initial interpretation  and I agree with the findings.    MARYELLEN DEWEY MD   XR Abdomen Port 1 View    Narrative    Exam: XR ABDOMEN PORT 1 VW, 7/14/2018 5:27 AM    Indication: Sigmoid decompression    Comparison: 7/13/2018    Findings:   Portable AP views of the abdomen were obtained. Contrast from the  previous CT opacifies the renal collecting systems and urinary  bladder. Possibly mildly decreased degree of gaseous distention. No  pneumatosis identified. Lumbar scoliosis with associated degenerative  changes.      Impression    Impression:   Persistent gaseous distention of the colon although less than on  previous CT. Recommend short-term follow-up radiographs.  I have personally reviewed the examination and initial interpretation  and I agree with the findings.    MARYELLEN DEWEY MD   XR Abdomen 2 Views    Narrative    XR ABDOMEN 2 VW  7/14/2018 11:59 AM      HISTORY: monitor for volvulus;     COMPARISON: 7/13/2018    FINDINGS: Upper and supine frontal views of the abdomen. Persistent  gaseous distention of the colon, decreased from the prior study. No  definite free air. No portal venous gas. Heart size is normal. Lung  bases are relatively clear. Contrast material from prior study within  the urinary bladder.      Impression    IMPRESSION: Persistent gaseous distention of the colon, decreased from  the prior study.    I have personally reviewed the examination and initial  interpretation  and I agree with the findings.    MARYELLEN DEWEY MD                Pending Results:   Unresulted Labs Ordered in the Past 30 Days of this Admission     No orders found from 5/14/2018 to 7/14/2018.                  Discharge Instructions and Follow-Up:     Discharge Procedure Orders  Reason for your hospital stay   Order Comments: Admitted for sigmoid volvulus SP reduction of volvulus by flex sig     Adult [facility] Follow-up and recommended labs and tests   Order Comments: Follow up with primary care provider, Provider Not In System, within 7 days for hospital follow- up.  No follow up labs or test are needed.      Appointments on [place] and/or [place] campus (with [facility] or [facility] provider or service). Call [phone] if you haven't heard regarding these appointments within 7 days of discharge.     Activity   Order Comments: Your activity upon discharge: activity as tolerated   Order Specific Question Answer Comments   Is discharge order? Yes      Diet   Order Comments: Follow this diet upon discharge: Orders Placed This Encounter     Regular Diet Adult   Order Specific Question Answer Comments   Is discharge order? Yes               Lyndon Hua  Internal Medicine Staff Hospitalist  [phone]  July 15, 2018        Time spent on patient: 25 minutes total including face to face and coordinating care time reviewing current illness, any medication changes, and the care plan for today.[KK1.1]         Revision History        User Key Date/Time User Provider Type Action    > KK1.2 7/15/2018 10:13 AM Lyndon Hua MD Physician Sign     KK1.1 7/15/2018  9:56 AM Lyndon Hua MD Physician                      Consult Notes      Consults by Tremaine Traylor MD at 7/14/2018  5:41 AM     Author:  Tremaine Traylor MD Service:  Gastroenterology Author Type:  Resident    Filed:  7/14/2018  5:41 AM Date of Service:  7/14/2018  5:41 AM Creation Time:  7/14/2018  5:41  AM    Status:  Attested :  Tremaine Traylor MD (Resident)    Cosigner:  Marco A Cheung MD at 7/14/2018 10:19 AM         Consult Orders:    1. GI LUMINAL ADULT IP CONSULT: Patient to be seen: Routine within 24 hrs; Call back #: [phone]; sigmoid volvulus; Consultant may enter orders: Yes [[id number]] ordered by Jovanny Ramos MD at 07/14/18 0047           Attestation signed by Marco A Cheung MD at 7/14/2018 10:19 AM        Attestation:  ATTENDING ATTESTATION:     DATE SEEN: 7/14/2018    Patient was discussed, seen, and examined by Marco A torrez. The plan of care and pertinent data/imaging were also reviewed with the GI Consult team and GI Fellow. Agree with the above assessment and plan.    Please contact me with any further questions.    Marco A Cheung MD    [facility]  Division of Gastroenterology, Hepatology and Nutrition                                     Brief GI note      Consult note already placed in EPIC       Tremaine Traylor  GI Fellow[MT1.1]      Revision History        User Key Date/Time User Provider Type Action    > MT1.1 7/14/2018  5:41 AM Tremaine Traylor MD Resident Sign            Consults by Serenity Saldana MD at 7/13/2018 11:38 PM     Author:  Serenity Saldana MD Service:  Colorectal Surgery Author Type:  Physician    Filed:  7/14/2018  7:49 AM Date of Service:  7/13/2018 11:38 PM Creation Time:  7/13/2018 11:36 PM    Status:  Signed :  Serenity Saldana MD (Physician)         Colorectal Surgery Consult Note    Hardy Acevedo MRN# [id number]     Date of Admission: 7/13/2018    CC:[HN1.1] abdominal pain, distension[HN1.2]    HPI:[HN1.1] 89 year old male with hx of TIA on Plavix, dementia and 2 prior episodes of sigmoid volvulus who presents with abdominal pain and distension. He moved to a memory care unit about 2 weeks ago, and his home RN came to visit today. She was  concerned RE distension which prompted trip to ED. No fevers or chills. Denies nausea or emesis. Has been tolerating diet prior to this. Struggles with chronic constipation, on miralax QD, and sometimes receives MOM and suppositories.     He is here with his daughter who provided significant portion of history. She reports that he has had two prior episodes of sigmoid volvulus (2014 and 2016), both times resolved after endoscopic decompression. Surgical intervention was not pursued in this situations in the setting of advanced age. He has been on miralax for bowel regimen as mentioned above, and family is unsure of how diligently he has been taking this (especially since he was staying in assisted living home prior to 2 weeks ago). Patient and family are interested in minimally invasive approach to care, however would like to discuss surgical options further if felt he could tolerate operation well. Code status was confirmed to be DNR/DNI.[HN1.2]    Past Medical History:[HN1.1]  TIA on plavix  Age related dementia  Bladder cancer s/p TURP  Peptic ulcer disease   Benign prostatic hyperplasia  Small hiatal hernia  Osteoarthritis[HN1.2]    Past Surgical History:[HN1.1]  Open pyloroplasty and vagotomy for duodenal ulcer  Bilateral inguinal hernia repair  Varicose vein surgery  TURP for bladder cancer  Left elbow basal cell carcinoma excision[HN1.2]    Allergies:[HN1.1]  Atorvastatin  Penicillins[HN1.2]    Medications:[HN1.1]  Plavix  Miralax   Flomax  Simvastatin  Protonix  Tylenol  Trazodone[HN1.2]    Social History:[HN1.1]  Former smoker, quit 1955  Minimal etoh use  Recently switched homes from assisted living to memory care type of housing about 2 weeks ago  Has 2 sons and 1 daughter who are closely involved in his care[HN1.2]    Family History:[HN1.1]  Father - colon cancer  Brother - Alzheimer's  Brother - Parkinson's[HN1.2]    ROS:[HN1.1]  CONSTITUTIONAL: no fatigue, no unexpected change in weight  SKIN: no  worrisome rashes, no worrisome moles, no worrisome lesions  ENT: no ear problems, no mouth problems, no throat problems  RESP: no significant cough, no shortness of breath  CV: no chest pain, no palpitations, no new or worsening peripheral edema  GI: no nausea, no vomiting, +constipation, no hematochezia or melena  : no frequency, no dysuria, no hematuria  MS: no claudication, no myalgias, no joint aches  ENDOCRINE: no temperature intolerance, no skin/hair changes  HEME: no easy bruising, no bleeding problems    Exam:  BP[HN1.2] (!) 170/94  Pulse 105  Temp 98.2  F (36.8  C) (Oral)  Resp 16  Wt 71.2 kg (157 lb)  SpO2 97%  No acute distress,[HN1.1] resting comfortably[HN1.2]  Non labored respirations on[HN1.1] room air  Mild tachycardia[HN1.2], regular rhythm[HN1.1],[HN1.2] peripheral pulses 2+  Soft,[HN1.1] very distended, +tympany, lower abdomen tender to palpation, no rebound, no guarding, no peritonitis[HN1.2]  Extremities warm, well perfused[HN1.1]  Pleasantly confused[HN1.2]    Labs:[HN1.1]   Wbc 6.4  Hgb 13.6  INR 1.14  Cr 0.78  Trop 0.037  LA 1.5[HN1.2]    Imaging:[HN1.1]   CT abd/pel 7/13 with sigmoid volvulus, colon massively distended, no free air, pneumatosis or portal venous gas, non-obstructing loop of bowel in left inguinal hernia, small hiatal hernia.[HN1.2]    Assessment: 89 year old male[HN1.1] with hx TIA on plavix and dementia who presents with his third episode of sigmoid volvulus. HDS, no peritonitis, no evidence of pneumatosis or perforation on CT.[HN1.2]    Plan:   - No indication for emergent surgical intervention  - Recommend admission to medicine service   - GI consult to consider endoscopic decompression    -[HN1.1] Ongoing discussions about[HN1.2] potential operative intervention[HN1.1] given high risk of recurrence[HN1.2] vs non-op mgmt alone[HN1.1] in the setting of older age and long periods between prior recurrences[HN1.2] with patient/family[HN1.1]  - Please HOLD  plavix[HN1.3]     Discussed with[HN1.1] colorectal fellow, who will discuss with staff.[HN1.2]     Oralia Reilly MD  General Surgery PGY-3[HN1.1]    Staff Addendum:  Agree with the consultation H&P as documented by the housestaff. I was personally involved with the recommendations made by our service for this patient.  Sigmoid volvulus in very elderly patient. He does not want to consider surgery.  Planning for gastroenterology to flexible sigmoidoscopy patient for detorsion.    Serenity Saldana MD  Colon and Rectal Surgery Staff  [facility][GM1.1]       Revision History        User Key Date/Time User Provider Type Action    > GM1.1 7/14/2018  7:49 AM Serenity Saldana MD Physician Sign     HN1.2 7/14/2018  5:24 AM Oralia Reilly MD Resident Sign     HN1.3 7/13/2018 11:53 PM Oralia Reilly MD Resident      HN1.1 7/13/2018 11:36 PM Oralia Reilly MD Resident             Consults by Tremaine Traylor MD at 7/14/2018  1:00 AM     Author:  Tremaine Traylor MD Service:  Gastroenterology Author Type:  Resident    Filed:  7/14/2018  2:35 AM Date of Service:  7/14/2018  1:00 AM Creation Time:  7/14/2018  1:00 AM    Status:  Attested :  Tremaine Traylor MD (Resident)    Cosigner:  Marco A Cheung MD at 7/14/2018  3:28 AM        Attestation signed by Marco A Cheung MD at 7/14/2018  3:28 AM        Attestation:  ATTENDING ATTESTATION:     DATE SEEN: 7/14/2018    Patient was discussed, seen, and examined by Marco A torrez. The plan of care and pertinent data/imaging were also reviewed with the GI Consult team and GI Fellow. Agree with the assessment and plan as delineated above with the following additions:     Proceed with sigmoidoscopy to reduce the volvulus. Risks discussed with the daughter who is the POA. He is at high risk for future recurrence. Recommend serious consideration for sigmoidectomy prior to discharge.    Please contact  me with any further questions.    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 In the ED CT A/P showing sigmoid volvulus with associated pan colonic distention with no evidence of intra-abdominal free air, pneumatosis or portal vein gas. Patient hemodynamically stable with normal lactate and no further signs of sepsis. We will proceed with sigmoid[MT1.1] volvulus reduction[MT1.2]. Given[MT1.1] high risk for[MT1.2] recurrence we strongly recommend surgical intervention prior[MT1.1] to[MT1.2] discharge.     Recommendations  --[MT1.1]Endoscopic intervention for s[MT1.2]igmoid[MT1.1] volvulus reduction[MT1.2] overnight   --Continue ongoing supportive cares[MT1.1]  --Consider surgical intervention prior to discharge given risk of recurrence  --We will follow along[MT1.2]     Gastroenterology follow up recommendations: TBD    Thank you for involving us in this patient's care. Please do not hesitate to contact the GI service with any questions or concerns.     Pt care plan discussed with Dr. Cheung, GI staff physician.    Tremaine Traylor  GI Fellow  -------------------------------------------------------------------------------------------------------------------          Chief Complaint:   We were asked by Dr. Kumar of ED to evaluate this patient with Sigmoid volvulus    History is obtained from the patient and the medical record.          History of Present Illness:   Hardy Acevedo is a 89 year old male with a history of dementia, TIA  on plavix, gastric ulcer, BPH, recurrent sigmoid volvulus requiring requiring endoscopic decompression in may 2016 who presented to the ED with abdominal pain, distention as well as constipation. In the ED CT A/P showing sigmoid volvulus with associated pan colonic distention with no evidence of intra-abdominal free air, pneumatosis or portal vein gas. Patient hemodynamically stable with normal lactate and no further signs of sepsis.[MT1.1]  History obtained from Rachele his daughter. Apparently he has been having since this morning abdominal pain and distention with some constipation despite been on miralax and senna-docusate. Patient both prior episodes of sigmoid volvulus had same symptoms and were successfully treated with endoscopic intervention. Patient with dementia so very hard to obtain history from. No fevers, chills or other signs of sepsis. Daughter reports patient had a very small stool this morning.[MT1.2]          Past Medical History:[MT1.1]   -TIA  -Bladder cancer  -Dementia[MT1.2]         Past Surgical History:[MT1.1]   -Gastric ulcer[MT1.2]         Previous Endoscopy:   No results found for this or any previous visit.         Social History:   Reviewed and edited as appropriate  Social History     Social History     Marital status:      Spouse name: N/A     Number of children: N/A     Years of education: N/A     Occupational History     Not on file.     Social History Main Topics     Smoking status: Not on file     Smokeless tobacco: Not on file     Alcohol use Not on file     Drug use: Not on file     Sexual activity: Not on file     Other Topics Concern     Not on file     Social History Narrative            Family History:   Reviewed and edited as appropriate  No known history of gastrointestinal/liver disease or  gastrointestinal malignancies       Allergies:   Reviewed and edited as appropriate     Allergies   Allergen Reactions     Atorvastatin      Penicillins              Medications:[MT1.1]     Current Facility-Administered Medications   Medication     sodium chloride 0.9% infusion     No current outpatient prescriptions on file.[MT1.3]             Review of Systems:   A complete review of systems was performed and is negative except as noted in the HPI           Physical Exam:   BP (!) 170/94  Pulse 105  Temp 98.2  F (36.8  C) (Oral)  Resp 16  Wt 71.2 kg (157 lb)  SpO2 97%  Wt:   Wt Readings from Last 2 Encounters:   07/13/18 71.2 kg (157 lb)      Constitutional: cooperative, pleasant, not dyspneic/diaphoretic, no acute distress  Eyes: Sclera anicteric/injected  Ears/nose/mouth/throat: Normal oropharynx without ulcers or exudate, mucus membranes moist, hearing intact  Neck: supple, thyroid normal size  CV: No edema  Respiratory: Unlabored breathing  Lymph: No axillary, submandibular, supraclavicular or inguinal lymphadenopathy  Abd:[MT1.1] Significantly distended, +BS, mildly tender diffusely no peritoneal signs.[MT1.2]  Skin: warm, perfused, no jaundice  Neuro:[MT1.1] Disoriented given dementia although alert[MT1.2], No asterixis  Psych: Normal affect[MT1.1] according to daughter[MT1.2]   MSK: No gross deformities         Data:   Labs and imaging below were independently reviewed and interpreted    BMP  Recent Labs  Lab 07/13/18 2025      POTASSIUM 3.4   CHLORIDE 107   BRAD 9.2   CO2 23   BUN 26   CR 0.78   *     CBC  Recent Labs  Lab 07/13/18 2025   WBC 6.4   RBC 4.21*   HGB 13.6   HCT 41.3   MCV 98   MCH 32.3   MCHC 32.9   RDW 15.4*        INR  Recent Labs  Lab 07/13/18 2025   INR 1.14     LFTs  Recent Labs  Lab 07/13/18 2025   ALKPHOS 56   AST 14   ALT 16   BILITOTAL 0.7   PROTTOTAL 7.5   ALBUMIN 4.3      PANC  Recent Labs  Lab 07/13/18 2025   LIPASE 353       Imaging:  CT A/P  1. Sigmoid volvulus with associated pan colonic distention. No  evidence of intra-abdominal free air, pneumatosis, or portal venous  gas.  2. Left inguinal hernia containing  nonobstructed loops of small bowel.  3. Small amount of pelvic free fluid.   4. Small hiatal hernia.[MT1.1]     Revision History        User Key Date/Time User Provider Type Action    > MT1.2 7/14/2018  2:35 AM Tremaine Traylor MD Resident Sign     MT1.3 7/14/2018  1:10 AM Tremaine Traylor MD Resident      MT1.1 7/14/2018  1:00 AM Tremaine Traylor MD Resident                      Progress Notes - Physician (Notes for yesterday and today)      Progress Notes by Tremaine Traylor MD at 7/14/2018 11:52 AM     Author:  Tremaine Traylor MD Service:  Gastroenterology Author Type:  Resident    Filed:  7/14/2018 11:53 AM Date of Service:  7/14/2018 11:52 AM Creation Time:  7/14/2018 11:52 AM    Status:  Signed :  Tremaine Traylor MD (Resident)         Gastroenterology Inpatient Sign Off Note    Inpatient GI consults service will sign off. No further recommendations at this time. If primary team has addition questions, please page consult fellow listed in Amion. Successful sigmoid decompression through sigmoidoscopy. Patient with large BM this am. No further recs. We strongly recommend sigmoidectomy prior to discharge given high risk of recurrence.    Current GI Consult Staff: Dr. Cheung     Follow up recommendations:   No outpatient GI clinic follow-up indicated. Follow-up with primary care provider at timing determined by discharge physician.[MT1.1]    Tremaine Traylor[MT1.2]  GI Fellow[MT1.1]     Revision History        User Key Date/Time User Provider Type Action    > MT1.2 7/14/2018 11:53 AM Tremaine Traylor MD Resident Sign     MT1.1 7/14/2018 11:52 AM Tremaine Traylor MD Resident             Progress Notes by Lyndon Hua MD at 7/14/2018  9:18 AM     Author:  Lyndon Hua MD Service:  Hospitalist Author Type:  Physician    Filed:  7/14/2018 10:10 AM Date of Service:  7/14/2018  9:18 AM Creation Time:  7/14/2018  9:18 AM    Status:  Signed :  Javid  Lyndon Hemphill MD (Physician)         [facility], [facility] Hospitalist Progress Note    Date of Service (when I saw the patient):[KK1.1] 07/14/2018    Assessment & Plan[KK1.2]     Hardy Acevedo is a 89 year old male  who has a history of dementia, TIA[KK1.1]s[KK1.3], hiatal hernia,[KK1.1]  HO[KK1.3] gastric ulcer[KK1.1] SP some kind of surgery 50 years ago[KK1.3], BPH, bladder c[KK1.1]an[KK1.3]cer,[KK1.1] ho Melanoma[KK1.3] and is admitted for recurrent sigmoid volvulous.         #Voluvlus- resolved    Hemodynamically stable. LA normal.[KK1.1] SP[KK1.3] Successful endoscopic reduction[KK1.1] by GI on 7/13[KK1.3]. High risk for reoccurrence.[KK1.1]  Keep on a good bowel regimen. GI recommends surgical evaluation. We will discuss with family first[KK1.3]     #Dementia[KK1.1]:[KK1.3] Oriented to self and year, not place or situation. At mental baseline per daughter. Has noticed worsening of dementia following recent nursing home move. Also is hard of hearing. Daughter reports somewhat frequent mild falls and gets out of bed frequently.  -Delirium precautions  -Fall precautions[KK1.1]  -has tazodone at NH, we will continue that.     # Rising Troponin, Tachycardia: No documented ho MI. Denies acute chest pain. EKG unchanged from yesterday, no old EKG for comparison. We will speak with daughter about what she wants to do further. Based on prior noted, family not interested in aggressive interventions. Get ECHO. Restart plavix and add metoprolol. Give gentle hydration. Start tele. Could be NSTEMI[KK1.3]    -addendum. Spoke with Daughter Rachele. She does not want any further investigation to be done or any other treatment to be done for any other things  Other than the current issue of volvulus.[KK1.4]     # Syncope  While in ER: Could be vasovagal. Now troponin going up. Start telee. Get ECHO. Hydrate. Check orthostatics. Get ECHO.[KK1.3]     --addendum. Spoke with Daughter Rachele.  She does not want any further investigation to be done or any other treatment to be done for any other things  Other than the current issue of volvulus.[KK1.4]     # Ho prostate Cancer, BPH: Monitor UO and ability to void. Continue flomax.   # Hiatal Hernia and ho GERD. Continue protonix.[KK1.3]       # Pain Assessment:  Current Pain Score 7/14/2018   Patient currently in pain? oscaries   Hardy s pain level was assessed and he currently denies pain.          Disposition Plan     Expected discharge: 2 - 3 days; recommended to prior living arrangement once resolution of bowel obstruction.     Entered: Diana Buck 07/14/2018, 6:27 AM      DVT Prophylaxis:[KK1.1] Pneumatic Compression Devices[KK1.4]  Code Status:[KK1.1] DNR/DNI[KK1.2]    Disposition:[KK1.1] DC tomorrow[KK1.4]     Lyndon Hua[KK1.2], MD[KK1.1]    Interval History[KK1.2]     Dementia, does not make sense most of the time. Eating okay per nursing. Daughter reports that he ambulates okay with walker at NH[KK1.4]      -Data reviewed today: I reviewed all new labs and imaging results over the last 24 hours. I personally reviewed[KK1.1] the EKG tracing showing tachycardia, non specific STT changes.[KK1.4] .[KK1.1]    Physical Exam   Temp: 98  F (36.7  C) Temp src: Axillary BP: 143/69 Pulse: 107 Heart Rate: 105 Resp: 18 SpO2: 95 % O2 Device: Nasal cannula Oxygen Delivery: 2 LPM  Vitals:    07/13/18 2133   Weight: 71.2 kg (157 lb)[KK1.2]     Vital Signs with Ranges[KK1.1]  Temp:  [97  F (36.1  C)-99.5  F (37.5  C)] 98  F (36.7  C)  Pulse:  [105-107] 107  Heart Rate:  [] 105  Resp:  [15-20] 18  BP: ()/(52-94) 143/69  SpO2:  [93 %-98 %] 95 %  I/O last 3 completed shifts:  In: 670 [I.V.:670]  Out: 0[KK1.2]     Constitutional:  Awake, alert,[KK1.1] dementia, confused[KK1.4]  Respiratory: Clear to auscultation bilaterally, no crackles or wheezing  Cardiovascular: Regular rate and rhythm, normal S1 and S2, and no murmur noted  GI:[KK1.1]  diminihsed[KK1.4] bowel sounds, soft, non-distended, non-tender  Skin/Integumen: No rashes, no cyanosis, no edema[KK1.1]    Medications     - MEDICATION INSTRUCTIONS -           Data     Recent Labs  Lab 07/14/18  0806 07/14/18  0056 07/13/18 2025   WBC 14.5*  --  6.4   HGB 13.0* 13.4 13.6   MCV 98  --  98     --  191   INR  --   --  1.14     --  141   POTASSIUM 3.2*  --  3.4   CHLORIDE 108  --  107   CO2 23  --  23   BUN 28  --  26   CR 0.86  --  0.78   ANIONGAP 12  --  11   BRAD 9.1  --  9.2   *  --  144*   ALBUMIN 4.0  --  4.3   PROTTOTAL 7.3  --  7.5   BILITOTAL 0.6  --  0.7   ALKPHOS 52  --  56   ALT 17  --  16   AST 22  --  14   LIPASE  --   --  353   TROPI 0.107* 0.068* 0.037       Recent Results (from the past 24 hour(s))   CT Abdomen Pelvis w Contrast    Narrative    EXAMINATION: CT ABDOMEN PELVIS W CONTRAST, 7/13/2018 10:00 PM    TECHNIQUE: Helical CT images from the lung bases through the symphysis  pubis were obtained with IV contrast. Contrast dose: 78cc isovue 370    COMPARISON: None available. Outside dictation 9/23/2016.    HISTORY: ? bowel obstruction, abd distension, pain    FINDINGS:    Abdomen and pelvis:   There is abnormal spiraling of the superior rectum with twisting of  the sigmoid colon. There is marked distention of the colon, including  the twisted sigmoid colon, measuring up to 10.6 cm in diameter. The  small bowel is not significantly distended. Loops of small bowel in  the left lower quadrant extend into a left inguinal hernia. The  distended colon has significant mass effect on the stomach. No  evidence of pneumatosis, portal venous gas, or intra-abdominal free  air.    1.5 cm enhancing lesion in hepatic segment 48, at the dome, series 5  image 38, likely representing a hemangioma. Otherwise, unremarkable  liver parenchyma. Gallbladder, spleen, partially atrophic pancreas,  and adrenal glands appear normal. Small splenule anterior to the  splenic hilum. Mild  age-related dilation of common bile duct.  Scattered too small to characterize hypodensities in the kidneys  bilaterally and a simple renal cyst in the anterior right kidney. No  hydronephrosis. Small amount of pelvic free fluid. The abdominal aorta  is tortuous, coursing to the right of the IVC. The major abdominal  vasculature is patent. No lymphadenopathy in the abdomen or pelvis.    Lung bases:   The heart is nonenlarged. Extensive atherosclerotic calcifications. No  pericardial effusion. Small to moderate sliding hiatal hernia.  Bibasilar atelectasis.    Bones and soft tissues:   Straightening of the normal lumbar lordosis with multilevel  spondylosis. No acute or worrisome osseous lesions. Old lateral right  rib fractures.        Impression    IMPRESSION:   1. Sigmoid volvulus with associated pan colonic distention. No  evidence of intra-abdominal free air, pneumatosis, or portal venous  gas.  2. Left inguinal hernia containing nonobstructed loops of small bowel.  3. Small amount of pelvic free fluid.   4. Small to moderate sliding hiatal hernia.      [Result: Sigmoid volvulus]    Finding was identified on 7/13/2018 10:04 PM.     Dr. Kumar was contacted by Dr. Dyer at 7/13/2018 10:10 PM and  verbalized understanding of the urgent finding.     I have personally reviewed the examination and initial interpretation  and I agree with the findings.    MARYELLEN DEWEY MD   XR Abdomen Port 1 View    Narrative    Persistent gaseous distention of the colon although less than on  previous CT. Recommend short-term follow-up radiographs.[KK1.2]          Revision History        User Key Date/Time User Provider Type Action    > KK1.4 7/14/2018 10:10 AM Lyndon Hua MD Physician Sign     KK1.3 7/14/2018  9:43 AM Lyndon Hua MD Physician      KK1.2 7/14/2018  9:19 AM Lyndon Hua MD Physician      KK1.1 7/14/2018  9:18 AM Lyndon Hua MD Physician             Progress  Notes by Oralia Reilly MD at 7/14/2018  6:57 AM     Author:  Oralia Reilly MD Service:  Colorectal Surgery Author Type:  Resident    Filed:  7/14/2018  7:11 AM Date of Service:  7/14/2018  6:57 AM Creation Time:  7/14/2018  6:57 AM    Status:  Signed :  Oralia Reilly MD (Resident)         Colorectal Surgery Progress Note    Hadry Acevedo  [id number]    Volvulus successfully reduced endoscopically overnight. Afebrile overnight. States that he thinks he has been passing flatus. No nausea or emesis. Some hiccups this morning.[HN1.1]    Temp:  [97  F (36.1  C)-98.2  F (36.8  C)] 97  F (36.1  C)  Pulse:  [105] 105  Heart Rate:  [] 109  Resp:  [15-20] 18  BP: ()/(52-94) 145/67  SpO2:  [94 %-98 %] 96 %[HN1.2]    Intake/Output Summary (Last 24 hours) at 07/14/18 0658  Last data filed at 07/14/18 0441   Gross per 24 hour   Intake              670 ml   Output                0 ml   Net              670 ml     NAD, sitting comfortably in bed  non-labored breathing on RA  soft, much less distended, less tender    WBC 6.4  Cr 0.78  Trop 0.037 -> 0.068    89 year old male with hx of TIA on plavix and dementia who presents with sigmoid volvulus, s/p successful endoscopic decompression overnight.    - No indication for emergent surgical intervention  - Ongoing discussions about potential operative intervention given high risk of recurrence vs non-op mgmt alone in the setting of older age, long periods between prior recurrences, and family preference- anticipate conservative mgmt  - Please HOLD plavix for now    Seen with colorectal fellow, who will discuss with staff.     Oralia Reilly MD  General Surgery PGY-3[HN1.1]     Revision History        User Key Date/Time User Provider Type Action    > HN1.2 7/14/2018  7:11 AM Oralia Reilly MD Resident Sign     HN1.1 7/14/2018  6:57 AM Oralia Reilly MD Resident                      Procedure Notes      Procedures by Marco A Chenug MD at  7/14/2018  3:23 AM     Author:  Marco A Cheung MD Service:  Gastroenterology Author Type:  Physician    Filed:  7/14/2018  3:27 AM Date of Service:  7/14/2018  3:23 AM Creation Time:  7/14/2018  3:22 AM    Status:  Signed :  Marco A Cheung MD (Physician)             Gastroenterology Endoscopy Brief Operative Note    Procedure:  Flexible sigmoidoscopy   Post-operative diagnosis:  Sigmoid volvulus   Staff Physician:  Dr. Marco A Cheung   Fellow/Assistant(s):  Dr. Tremaine Traylor    Specimens:  Please see final procedure note for further details.   Findings:  Sigmoid volvulus. Successully reduced.   Complications:  None.   Condition:  Stable   Recommendations  Diet:  Clear liquid diet and advanced as tolerated in AM  PPI:  N/A  Anti-coagulants/platelets:  N/A  Octreotide:  N/A  Additional recommendations:   -Admit to medicine  -Obtain AXR 2 view to confirm decompression  -Miralax at least once daily to help have 1-2 soft BMs daily  -Ambulate/move in bed  -Avoid narcotics  -Monitor stool output  -He is at high risk for recurrence of volvulus (this is his 3rd episode since 2016. Consider sigmoidectomy prior to discharge.[JH1.1]        Revision History        User Key Date/Time User Provider Type Action    > JH1.1 7/14/2018  3:27 AM Marco A Cheung MD Physician Sign                  Progress Notes - Therapies (Notes from 07/12/18 through 07/15/18)     No notes of this type exist for this encounter.                                          INTERAGENCY TRANSFER FORM - LAB / IMAGING / EKG / EMG RESULTS   7/13/2018                       [address] [facility]: [phone]            Unresulted Labs     None         Lab Results - 3 Days      Basic metabolic panel [[id number]] (Abnormal)  Resulted: 07/15/18 0716, Result status: Final result    Ordering provider: Lyndon Hua MD  07/14/18 3860 Resulting lab: [facility]    Specimen Information     Type Source Collected On   Blood  07/15/18 0630          Components       Value Reference Range Flag Lab   Sodium 145 133 - 144 mmol/L H 51   Potassium 3.6 3.4 - 5.3 mmol/L  51   Chloride 114 94 - 109 mmol/L H 51   Carbon Dioxide 23 20 - 32 mmol/L  51   Anion Gap 8 3 - 14 mmol/L  51   Glucose 92 70 - 99 mg/dL  51   Urea Nitrogen 30 7 - 30 mg/dL  51   Creatinine 0.84 0.66 - 1.25 mg/dL  51   GFR Estimate 85 >60 mL/min/1.7m2  51   Comment:  Non  GFR Calc   GFR Estimate If Black >90 >60 mL/min/1.7m2  51   Comment:  African American GFR Calc   Calcium 8.3 8.5 - 10.1 mg/dL L 51            Troponin I [[id number]] (Abnormal)  Resulted: 07/14/18 0840, Result status: Final result    Ordering provider: Jackie Beard MD  07/14/18 0742 Resulting lab: [facility]    Specimen Information    Type Source Collected On   Blood  07/14/18 0806          Components       Value Reference Range Flag Lab   Troponin I ES 0.107 0.000 - 0.045 ug/L H 51   Comment:         The 99th percentile for upper reference range is 0.045 ug/L.  Troponin values   in the range of 0.045 - 0.120 ug/L may be associated with risks of adverse   clinical events.              Comprehensive metabolic panel [[id number]] (Abnormal)  Resulted: 07/14/18 0840, Result status: Final result    Ordering provider: Jackie Beard MD  07/14/18 0742 Resulting lab: [facility]    Specimen Information    Type Source Collected On   Blood  07/14/18 0806          Components       Value Reference Range Flag Lab   Sodium 143 133 - 144 mmol/L  51   Potassium 3.2 3.4 - 5.3 mmol/L L 51   Chloride 108 94 - 109 mmol/L  51   Carbon Dioxide 23 20 - 32 mmol/L  51   Anion Gap 12 3 - 14 mmol/L  51   Glucose 131 70 - 99 mg/dL H 51   Urea Nitrogen 28 7 - 30 mg/dL  51   Creatinine 0.86 0.66 - 1.25 mg/dL  51   GFR Estimate 83 >60 mL/min/1.7m2  51   Comment:  Non  GFR Calc   GFR Estimate  If Black >90 >60 mL/min/1.7m2  51   Comment:  African American GFR Calc   Calcium 9.1 8.5 - 10.1 mg/dL  51   Bilirubin Total 0.6 0.2 - 1.3 mg/dL  51   Albumin 4.0 3.4 - 5.0 g/dL  51   Protein Total 7.3 6.8 - 8.8 g/dL  51   Alkaline Phosphatase 52 40 - 150 U/L  51   ALT 17 0 - 70 U/L  51   AST 22 0 - 45 U/L  51            CBC with platelets [[id number]] (Abnormal)  Resulted: 07/14/18 0815, Result status: Final result    Ordering provider: Jackie Beard MD  07/14/18 0767 Resulting lab: [facility]    Specimen Information    Type Source Collected On   Blood  07/14/18 0806          Components       Value Reference Range Flag Lab   WBC 14.5 4.0 - 11.0 10e9/L H 51   RBC Count 4.01 4.4 - 5.9 10e12/L L 51   Hemoglobin 13.0 13.3 - 17.7 g/dL L 51   Hematocrit 39.4 40.0 - 53.0 % L 51   MCV 98 78 - 100 fl  51   MCH 32.4 26.5 - 33.0 pg  51   MCHC 33.0 31.5 - 36.5 g/dL  51   RDW 15.5 10.0 - 15.0 % H 51   Platelet Count 178 150 - 450 10e9/L  51            Troponin I [[id number]] (Abnormal)  Resulted: 07/14/18 0135, Result status: Final result    Ordering provider: Nakita Kumar MD  07/14/18 0023 Resulting lab: [facility]    Specimen Information    Type Source Collected On   Blood  07/14/18 0056          Components       Value Reference Range Flag Lab   Troponin I ES 0.068 0.000 - 0.045 ug/L H 51   Comment:         The 99th percentile for upper reference range is 0.045 ug/L.  Troponin values   in the range of 0.045 - 0.120 ug/L may be associated with risks of adverse   clinical events.              Hemoglobin [[id number]]  Resulted: 07/14/18 0121, Result status: Final result    Ordering provider: Nakita Kumar MD  07/14/18 0023 Resulting lab: [facility]    Specimen Information    Type Source Collected On   Blood  07/14/18 0056          Components       Value Reference Range Flag Lab   Hemoglobin 13.4 13.3 - 17.7 g/dL  51             Troponin I [[id number]]  Resulted: 07/13/18 2122, Result status: Final result    Ordering provider: Nakita Kumar MD  07/13/18 2005 Resulting lab: [facility]    Specimen Information    Type Source Collected On   Blood  07/13/18 2025          Components       Value Reference Range Flag Lab   Troponin I ES 0.037 0.000 - 0.045 ug/L  51   Comment:         The 99th percentile for upper reference range is 0.045 ug/L.  Troponin values   in the range of 0.045 - 0.120 ug/L may be associated with risks of adverse   clinical events.              TSH with free T4 reflex [[id number]]  Resulted: 07/13/18 2122, Result status: Final result    Ordering provider: Nakita Kumar MD  07/13/18 2005 Resulting lab: [facility]    Specimen Information    Type Source Collected On   Blood  07/13/18 2025          Components       Value Reference Range Flag Lab   TSH 1.92 0.40 - 4.00 mU/L  51            Comprehensive metabolic panel [[id number]] (Abnormal)  Resulted: 07/13/18 2116, Result status: Final result    Ordering provider: Nakita Kmuar MD  07/13/18 2005 Resulting lab: [facility]    Specimen Information    Type Source Collected On   Blood  07/13/18 2025          Components       Value Reference Range Flag Lab   Sodium 141 133 - 144 mmol/L  51   Potassium 3.4 3.4 - 5.3 mmol/L  51   Chloride 107 94 - 109 mmol/L  51   Carbon Dioxide 23 20 - 32 mmol/L  51   Anion Gap 11 3 - 14 mmol/L  51   Glucose 144 70 - 99 mg/dL H 51   Urea Nitrogen 26 7 - 30 mg/dL  51   Creatinine 0.78 0.66 - 1.25 mg/dL  51   GFR Estimate >90 >60 mL/min/1.7m2  51   Comment:  Non  GFR Calc   GFR Estimate If Black >90 >60 mL/min/1.7m2  51   Comment:  African American GFR Calc   Calcium 9.2 8.5 - 10.1 mg/dL  51   Bilirubin Total 0.7 0.2 - 1.3 mg/dL  51   Albumin 4.3 3.4 - 5.0 g/dL  51   Protein Total 7.5 6.8 - 8.8 g/dL  51   Alkaline  Phosphatase 56 40 - 150 U/L  51   ALT 16 0 - 70 U/L  51   AST 14 0 - 45 U/L  51            Lipase [[id number]]  Resulted: 07/13/18 2116, Result status: Final result    Ordering provider: Nakita Kumar MD  07/13/18 2005 Resulting lab: [facility]    Specimen Information    Type Source Collected On   Blood  07/13/18 2025          Components       Value Reference Range Flag Lab   Lipase 353 73 - 393 U/L  51            UA with Microscopic [[id number]] (Abnormal)  Resulted: 07/13/18 2113, Result status: Final result    Ordering provider: Nakita Kumar MD  07/13/18 2005 Resulting lab: [facility]    Specimen Information    Type Source Collected On   Unspecified Urine Urine clean catch 07/13/18 2100          Components       Value Reference Range Flag Lab   Color Urine Yellow   51   Appearance Urine Clear   51   Glucose Urine Negative NEG^Negative mg/dL  51   Bilirubin Urine Negative NEG^Negative  51   Ketones Urine 10 NEG^Negative mg/dL A 51   Specific Gravity Urine 1.029 1.003 - 1.035  51   Blood Urine Negative NEG^Negative  51   pH Urine 5.5 5.0 - 7.0 pH  51   Protein Albumin Urine 30 NEG^Negative mg/dL A 51   Urobilinogen mg/dL Normal 0.0 - 2.0 mg/dL  51   Nitrite Urine Negative NEG^Negative  51   Leukocyte Esterase Urine Negative NEG^Negative  51   Source Unspecified Urine   51   WBC Urine 1 0 - 5 /HPF  51   RBC Urine 1 0 - 2 /HPF  51   Squamous Epithelial /HPF Urine <1 0 - 1 /HPF  51   Transitional Epi <1 0 - 1 /HPF  51   Mucous Urine Present NEG^Negative /LPF A 51   Hyaline Casts 1 0 - 2 /LPF  51            INR [[id number]]  Resulted: 07/13/18 2043, Result status: Final result    Ordering provider: Nakita Kumar MD  07/13/18 2005 Resulting lab: [facility]    Specimen Information    Type Source Collected On   Blood  07/13/18 2025          Components       Value Reference Range Flag Lab   INR 1.14 0.86 - 1.14  51             CBC with platelets differential [[id number]] (Abnormal)  Resulted: 07/13/18 2043, Result status: Final result    Ordering provider: Nakita Kumar MD  07/13/18 2005 Resulting lab: [facility]    Specimen Information    Type Source Collected On   Blood  07/13/18 2025          Components       Value Reference Range Flag Lab   WBC 6.4 4.0 - 11.0 10e9/L  51   RBC Count 4.21 4.4 - 5.9 10e12/L L 51   Hemoglobin 13.6 13.3 - 17.7 g/dL  51   Hematocrit 41.3 40.0 - 53.0 %  51   MCV 98 78 - 100 fl  51   MCH 32.3 26.5 - 33.0 pg  51   MCHC 32.9 31.5 - 36.5 g/dL  51   RDW 15.4 10.0 - 15.0 % H 51   Platelet Count 191 150 - 450 10e9/L  51   Diff Method Automated Method   51   % Neutrophils 82.7 %  51   % Lymphocytes 13.2 %  51   % Monocytes 3.9 %  51   % Eosinophils 0.0 %  51   % Basophils 0.0 %  51   % Immature Granulocytes 0.2 %  51   Nucleated RBCs 0 0 /100  51   Absolute Neutrophil 5.3 1.6 - 8.3 10e9/L  51   Absolute Lymphocytes 0.9 0.8 - 5.3 10e9/L  51   Absolute Monocytes 0.3 0.0 - 1.3 10e9/L  51   Absolute Eosinophils 0.0 0.0 - 0.7 10e9/L  51   Absolute Basophils 0.0 0.0 - 0.2 10e9/L  51   Abs Immature Granulocytes 0.0 0 - 0.4 10e9/L  51   Absolute Nucleated RBC 0.0   51            Lactic acid whole blood [[id number]]  Resulted: 07/13/18 2041, Result status: Final result    Ordering provider: Nakita Kumar MD  07/13/18 2005 Resulting lab: [facility]    Specimen Information    Type Source Collected On   Blood  07/13/18 2025          Components       Value Reference Range Flag Lab   Lactic Acid 1.5 0.7 - 2.0 mmol/L  51            Testing Performed By     Lab - Abbreviation Name Director Address Valid Date Range    51 - Unknown [facility] Unknown [address] [facility] [address] 12/31/14 1010 - Present               Imaging Results - 3 Days      XR Abdomen 2 Views [[id number]]  Resulted: 07/14/18 1212, Result  status: Final result    Ordering provider: Jovanny Rmaos MD  07/14/18 0626 Resulted by: Kiki Dewey MD [place], Brian P, MD    Performed: 07/14/18 1147 - 07/14/18 1159 Resulting lab: RADIOLOGY RESULTS    Narrative:       XR ABDOMEN 2 VW  7/14/2018 11:59 AM      HISTORY: monitor for volvulus;     COMPARISON: 7/13/2018    FINDINGS: Upper and supine frontal views of the abdomen. Persistent  gaseous distention of the colon, decreased from the prior study. No  definite free air. No portal venous gas. Heart size is normal. Lung  bases are relatively clear. Contrast material from prior study within  the urinary bladder.      Impression:       IMPRESSION: Persistent gaseous distention of the colon, decreased from  the prior study.    I have personally reviewed the examination and initial interpretation  and I agree with the findings.    KIKI DEEWY MD      XR Abdomen Port 1 View [[id number]]  Resulted: 07/14/18 1128, Result status: Final result    Ordering provider: Tremaine Traylor MD  07/14/18 0345 Resulted by: Kiki Dewey MD Ames, Elías Winters MD    Performed: 07/14/18 0357 - 07/14/18 0419 Resulting lab: RADIOLOGY RESULTS    Narrative:       Exam: XR ABDOMEN PORT 1 VW, 7/14/2018 5:27 AM    Indication: Sigmoid decompression    Comparison: 7/13/2018    Findings:   Portable AP views of the abdomen were obtained. Contrast from the  previous CT opacifies the renal collecting systems and urinary  bladder. Possibly mildly decreased degree of gaseous distention. No  pneumatosis identified. Lumbar scoliosis with associated degenerative  changes.      Impression:       Impression:   Persistent gaseous distention of the colon although less than on  previous CT. Recommend short-term follow-up radiographs.  I have personally reviewed the examination and initial interpretation  and I agree with the findings.    KIKI DEWEY MD      CT Abdomen Pelvis w Contrast [[id number]]  Resulted:  07/14/18 0749, Result status: Final result    Ordering provider: Nakita Kumar MD  07/13/18 2037 Resulted by: Kiki Wolf MD Ames, Elías Winters MD    Performed: 07/13/18 2147 - 07/13/18 2200 Resulting lab: RADIOLOGY RESULTS    Narrative:       EXAMINATION: CT ABDOMEN PELVIS W CONTRAST, 7/13/2018 10:00 PM    TECHNIQUE: Helical CT images from the lung bases through the symphysis  pubis were obtained with IV contrast. Contrast dose: 78cc isovue 370    COMPARISON: None available. Outside dictation 9/23/2016.    HISTORY: ? bowel obstruction, abd distension, pain    FINDINGS:    Abdomen and pelvis:   There is abnormal spiraling of the superior rectum with twisting of  the sigmoid colon. There is marked distention of the colon, including  the twisted sigmoid colon, measuring up to 10.6 cm in diameter. The  small bowel is not significantly distended. Loops of small bowel in  the left lower quadrant extend into a left inguinal hernia. The  distended colon has significant mass effect on the stomach. No  evidence of pneumatosis, portal venous gas, or intra-abdominal free  air.    1.5 cm enhancing lesion in hepatic segment 48, at the dome, series 5  image 38, likely representing a hemangioma. Otherwise, unremarkable  liver parenchyma. Gallbladder, spleen, partially atrophic pancreas,  and adrenal glands appear normal. Small splenule anterior to the  splenic hilum. Mild age-related dilation of common bile duct.  Scattered too small to characterize hypodensities in the kidneys  bilaterally and a simple renal cyst in the anterior right kidney. No  hydronephrosis. Small amount of pelvic free fluid. The abdominal aorta  is tortuous, coursing to the right of the IVC. The major abdominal  vasculature is patent. No lymphadenopathy in the abdomen or pelvis.    Lung bases:   The heart is nonenlarged. Extensive atherosclerotic calcifications. No  pericardial effusion. Small to moderate sliding hiatal hernia.  Bibasilar  atelectasis.    Bones and soft tissues:   Straightening of the normal lumbar lordosis with multilevel  spondylosis. No acute or worrisome osseous lesions. Old lateral right  rib fractures.        Impression:       IMPRESSION:   1. Sigmoid volvulus with associated pan colonic distention. No  evidence of intra-abdominal free air, pneumatosis, or portal venous  gas.  2. Left inguinal hernia containing nonobstructed loops of small bowel.  3. Small amount of pelvic free fluid.   4. Small to moderate sliding hiatal hernia.      [Result: Sigmoid volvulus]    Finding was identified on 7/13/2018 10:04 PM.     Dr. Kumar was contacted by Dr. Dyer at 7/13/2018 10:10 PM and  verbalized understanding of the urgent finding.     I have personally reviewed the examination and initial interpretation  and I agree with the findings.    MARYELLEN DEWEY MD      Testing Performed By     Lab - Abbreviation Name Director Address Valid Date Range    104 - Rad Rslts RADIOLOGY RESULTS Unknown Unknown 02/16/05 1553 - Present            Encounter-Level Documents:     There are no encounter-level documents.      Order-Level Documents:     There are no order-level documents.

## 2018-07-14 ENCOUNTER — APPOINTMENT (OUTPATIENT)
Dept: GENERAL RADIOLOGY | Facility: CLINIC | Age: 83
DRG: 345 | End: 2018-07-14
Payer: COMMERCIAL

## 2018-07-14 ENCOUNTER — ANESTHESIA (OUTPATIENT)
Dept: SURGERY | Facility: CLINIC | Age: 83
DRG: 345 | End: 2018-07-14
Payer: COMMERCIAL

## 2018-07-14 ENCOUNTER — ANESTHESIA EVENT (OUTPATIENT)
Dept: SURGERY | Facility: CLINIC | Age: 83
DRG: 345 | End: 2018-07-14
Payer: COMMERCIAL

## 2018-07-14 ENCOUNTER — APPOINTMENT (OUTPATIENT)
Dept: GENERAL RADIOLOGY | Facility: CLINIC | Age: 83
DRG: 345 | End: 2018-07-14
Attending: PEDIATRICS
Payer: COMMERCIAL

## 2018-07-14 PROBLEM — K56.2 VOLVULUS (H): Status: ACTIVE | Noted: 2018-07-14

## 2018-07-14 LAB
ALBUMIN SERPL-MCNC: 4 G/DL (ref 3.4–5)
ALP SERPL-CCNC: 52 U/L (ref 40–150)
ALT SERPL W P-5'-P-CCNC: 17 U/L (ref 0–70)
ANION GAP SERPL CALCULATED.3IONS-SCNC: 12 MMOL/L (ref 3–14)
AST SERPL W P-5'-P-CCNC: 22 U/L (ref 0–45)
BILIRUB SERPL-MCNC: 0.6 MG/DL (ref 0.2–1.3)
BUN SERPL-MCNC: 28 MG/DL (ref 7–30)
CALCIUM SERPL-MCNC: 9.1 MG/DL (ref 8.5–10.1)
CHLORIDE SERPL-SCNC: 108 MMOL/L (ref 94–109)
CO2 SERPL-SCNC: 23 MMOL/L (ref 20–32)
CREAT SERPL-MCNC: 0.86 MG/DL (ref 0.66–1.25)
ERYTHROCYTE [DISTWIDTH] IN BLOOD BY AUTOMATED COUNT: 15.5 % (ref 10–15)
FLEXIBLE SIGMOIDOSCOPY: NORMAL
GFR SERPL CREATININE-BSD FRML MDRD: 83 ML/MIN/1.7M2
GLUCOSE SERPL-MCNC: 131 MG/DL (ref 70–99)
HCT VFR BLD AUTO: 39.4 % (ref 40–53)
HGB BLD-MCNC: 13 G/DL (ref 13.3–17.7)
HGB BLD-MCNC: 13.4 G/DL (ref 13.3–17.7)
MCH RBC QN AUTO: 32.4 PG (ref 26.5–33)
MCHC RBC AUTO-ENTMCNC: 33 G/DL (ref 31.5–36.5)
MCV RBC AUTO: 98 FL (ref 78–100)
PLATELET # BLD AUTO: 178 10E9/L (ref 150–450)
POTASSIUM SERPL-SCNC: 3.2 MMOL/L (ref 3.4–5.3)
PROT SERPL-MCNC: 7.3 G/DL (ref 6.8–8.8)
RBC # BLD AUTO: 4.01 10E12/L (ref 4.4–5.9)
SODIUM SERPL-SCNC: 143 MMOL/L (ref 133–144)
TROPONIN I SERPL-MCNC: 0.07 UG/L (ref 0–0.04)
TROPONIN I SERPL-MCNC: 0.11 UG/L (ref 0–0.04)
WBC # BLD AUTO: 14.5 10E9/L (ref 4–11)

## 2018-07-14 PROCEDURE — 93010 ELECTROCARDIOGRAM REPORT: CPT | Performed by: INTERNAL MEDICINE

## 2018-07-14 PROCEDURE — 71000014 ZZH RECOVERY PHASE 1 LEVEL 2 FIRST HR: Performed by: INTERNAL MEDICINE

## 2018-07-14 PROCEDURE — 74018 RADEX ABDOMEN 1 VIEW: CPT

## 2018-07-14 PROCEDURE — 25000132 ZZH RX MED GY IP 250 OP 250 PS 637: Performed by: HOSPITALIST

## 2018-07-14 PROCEDURE — 93005 ELECTROCARDIOGRAM TRACING: CPT

## 2018-07-14 PROCEDURE — 36415 COLL VENOUS BLD VENIPUNCTURE: CPT | Performed by: INTERNAL MEDICINE

## 2018-07-14 PROCEDURE — 25000132 ZZH RX MED GY IP 250 OP 250 PS 637: Performed by: PEDIATRICS

## 2018-07-14 PROCEDURE — 12000008 ZZH R&B INTERMEDIATE UMMC

## 2018-07-14 PROCEDURE — 99232 SBSQ HOSP IP/OBS MODERATE 35: CPT | Performed by: HOSPITALIST

## 2018-07-14 PROCEDURE — 84484 ASSAY OF TROPONIN QUANT: CPT | Performed by: INTERNAL MEDICINE

## 2018-07-14 PROCEDURE — 71000015 ZZH RECOVERY PHASE 1 LEVEL 2 EA ADDTL HR: Performed by: INTERNAL MEDICINE

## 2018-07-14 PROCEDURE — 27210794 ZZH OR GENERAL SUPPLY STERILE: Performed by: INTERNAL MEDICINE

## 2018-07-14 PROCEDURE — 25000125 ZZHC RX 250: Performed by: ANESTHESIOLOGY

## 2018-07-14 PROCEDURE — 36000059 ZZH SURGERY LEVEL 3 EA 15 ADDTL MIN UMMC: Performed by: INTERNAL MEDICINE

## 2018-07-14 PROCEDURE — 36000057 ZZH SURGERY LEVEL 3 1ST 30 MIN - UMMC: Performed by: INTERNAL MEDICINE

## 2018-07-14 PROCEDURE — 40000141 ZZH STATISTIC PERIPHERAL IV START W/O US GUIDANCE

## 2018-07-14 PROCEDURE — 85018 HEMOGLOBIN: CPT | Performed by: EMERGENCY MEDICINE

## 2018-07-14 PROCEDURE — 84484 ASSAY OF TROPONIN QUANT: CPT | Performed by: EMERGENCY MEDICINE

## 2018-07-14 PROCEDURE — 25000128 H RX IP 250 OP 636: Performed by: ANESTHESIOLOGY

## 2018-07-14 PROCEDURE — C9399 UNCLASSIFIED DRUGS OR BIOLOG: HCPCS | Performed by: ANESTHESIOLOGY

## 2018-07-14 PROCEDURE — 25000128 H RX IP 250 OP 636: Performed by: HOSPITALIST

## 2018-07-14 PROCEDURE — 37000009 ZZH ANESTHESIA TECHNICAL FEE, EACH ADDTL 15 MIN: Performed by: INTERNAL MEDICINE

## 2018-07-14 PROCEDURE — 85027 COMPLETE CBC AUTOMATED: CPT | Performed by: INTERNAL MEDICINE

## 2018-07-14 PROCEDURE — 80053 COMPREHEN METABOLIC PANEL: CPT | Performed by: INTERNAL MEDICINE

## 2018-07-14 PROCEDURE — 25000125 ZZHC RX 250: Performed by: INTERNAL MEDICINE

## 2018-07-14 PROCEDURE — 25000132 ZZH RX MED GY IP 250 OP 250 PS 637: Performed by: PHYSICIAN ASSISTANT

## 2018-07-14 PROCEDURE — 74019 RADEX ABDOMEN 2 VIEWS: CPT

## 2018-07-14 PROCEDURE — 37000008 ZZH ANESTHESIA TECHNICAL FEE, 1ST 30 MIN: Performed by: INTERNAL MEDICINE

## 2018-07-14 PROCEDURE — 25000128 H RX IP 250 OP 636: Performed by: PEDIATRICS

## 2018-07-14 PROCEDURE — 25000566 ZZH SEVOFLURANE, EA 15 MIN: Performed by: INTERNAL MEDICINE

## 2018-07-14 PROCEDURE — 99207 ZZC CDG-MDM COMPONENT: MEETS LOW - DOWN CODED: CPT | Performed by: HOSPITALIST

## 2018-07-14 PROCEDURE — 0D9N8ZZ DRAINAGE OF SIGMOID COLON, VIA NATURAL OR ARTIFICIAL OPENING ENDOSCOPIC: ICD-10-PCS | Performed by: INTERNAL MEDICINE

## 2018-07-14 RX ORDER — SODIUM CHLORIDE AND POTASSIUM CHLORIDE 150; 900 MG/100ML; MG/100ML
INJECTION, SOLUTION INTRAVENOUS CONTINUOUS
Status: ACTIVE | OUTPATIENT
Start: 2018-07-14 | End: 2018-07-14

## 2018-07-14 RX ORDER — POLYETHYLENE GLYCOL 3350 17 G/17G
17 POWDER, FOR SOLUTION ORAL 2 TIMES DAILY
Status: DISCONTINUED | OUTPATIENT
Start: 2018-07-14 | End: 2018-07-15 | Stop reason: HOSPADM

## 2018-07-14 RX ORDER — PANTOPRAZOLE SODIUM 40 MG/1
40 TABLET, DELAYED RELEASE ORAL
Status: DISCONTINUED | OUTPATIENT
Start: 2018-07-14 | End: 2018-07-15 | Stop reason: HOSPADM

## 2018-07-14 RX ORDER — SODIUM CHLORIDE, SODIUM LACTATE, POTASSIUM CHLORIDE, CALCIUM CHLORIDE 600; 310; 30; 20 MG/100ML; MG/100ML; MG/100ML; MG/100ML
INJECTION, SOLUTION INTRAVENOUS CONTINUOUS PRN
Status: DISCONTINUED | OUTPATIENT
Start: 2018-07-14 | End: 2018-07-14

## 2018-07-14 RX ORDER — NALOXONE HYDROCHLORIDE 0.4 MG/ML
.1-.4 INJECTION, SOLUTION INTRAMUSCULAR; INTRAVENOUS; SUBCUTANEOUS
Status: DISCONTINUED | OUTPATIENT
Start: 2018-07-14 | End: 2018-07-15 | Stop reason: HOSPADM

## 2018-07-14 RX ORDER — SENNOSIDES 8.6 MG
2 TABLET ORAL 2 TIMES DAILY
Status: DISCONTINUED | OUTPATIENT
Start: 2018-07-14 | End: 2018-07-15 | Stop reason: HOSPADM

## 2018-07-14 RX ORDER — NALOXONE HYDROCHLORIDE 0.4 MG/ML
.1-.4 INJECTION, SOLUTION INTRAMUSCULAR; INTRAVENOUS; SUBCUTANEOUS
Status: ACTIVE | OUTPATIENT
Start: 2018-07-14 | End: 2018-07-15

## 2018-07-14 RX ORDER — POTASSIUM CHLORIDE 750 MG/1
40 TABLET, EXTENDED RELEASE ORAL ONCE
Status: COMPLETED | OUTPATIENT
Start: 2018-07-14 | End: 2018-07-14

## 2018-07-14 RX ORDER — LABETALOL HYDROCHLORIDE 5 MG/ML
5 INJECTION, SOLUTION INTRAVENOUS EVERY 10 MIN PRN
Status: DISCONTINUED | OUTPATIENT
Start: 2018-07-14 | End: 2018-07-15 | Stop reason: HOSPADM

## 2018-07-14 RX ORDER — ONDANSETRON 2 MG/ML
4 INJECTION INTRAMUSCULAR; INTRAVENOUS EVERY 30 MIN PRN
Status: DISCONTINUED | OUTPATIENT
Start: 2018-07-14 | End: 2018-07-15 | Stop reason: HOSPADM

## 2018-07-14 RX ORDER — PROPOFOL 10 MG/ML
INJECTION, EMULSION INTRAVENOUS PRN
Status: DISCONTINUED | OUTPATIENT
Start: 2018-07-14 | End: 2018-07-14

## 2018-07-14 RX ORDER — HYDROXYZINE HCL 10 MG/5 ML
25 SOLUTION, ORAL ORAL EVERY 6 HOURS PRN
Status: DISCONTINUED | OUTPATIENT
Start: 2018-07-14 | End: 2018-07-15 | Stop reason: HOSPADM

## 2018-07-14 RX ORDER — ONDANSETRON 2 MG/ML
INJECTION INTRAMUSCULAR; INTRAVENOUS PRN
Status: DISCONTINUED | OUTPATIENT
Start: 2018-07-14 | End: 2018-07-14

## 2018-07-14 RX ORDER — SODIUM CHLORIDE 9 MG/ML
INJECTION, SOLUTION INTRAVENOUS CONTINUOUS
Status: ACTIVE | OUTPATIENT
Start: 2018-07-14 | End: 2018-07-14

## 2018-07-14 RX ORDER — CLOPIDOGREL BISULFATE 75 MG/1
75 TABLET ORAL DAILY
Status: DISCONTINUED | OUTPATIENT
Start: 2018-07-14 | End: 2018-07-15 | Stop reason: HOSPADM

## 2018-07-14 RX ORDER — LIDOCAINE HYDROCHLORIDE 20 MG/ML
INJECTION, SOLUTION INFILTRATION; PERINEURAL PRN
Status: DISCONTINUED | OUTPATIENT
Start: 2018-07-14 | End: 2018-07-14

## 2018-07-14 RX ORDER — HYDROXYZINE HCL 10 MG/5 ML
25 SOLUTION, ORAL ORAL EVERY 6 HOURS PRN
Status: DISCONTINUED | OUTPATIENT
Start: 2018-07-14 | End: 2018-07-14

## 2018-07-14 RX ORDER — HYDROXYZINE HYDROCHLORIDE 25 MG/1
50 TABLET, FILM COATED ORAL ONCE
Status: DISCONTINUED | OUTPATIENT
Start: 2018-07-14 | End: 2018-07-15 | Stop reason: HOSPADM

## 2018-07-14 RX ORDER — HYDRALAZINE HYDROCHLORIDE 20 MG/ML
2.5-5 INJECTION INTRAMUSCULAR; INTRAVENOUS EVERY 10 MIN PRN
Status: DISCONTINUED | OUTPATIENT
Start: 2018-07-14 | End: 2018-07-14

## 2018-07-14 RX ORDER — FENTANYL CITRATE 50 UG/ML
INJECTION, SOLUTION INTRAMUSCULAR; INTRAVENOUS PRN
Status: DISCONTINUED | OUTPATIENT
Start: 2018-07-14 | End: 2018-07-14

## 2018-07-14 RX ORDER — TAMSULOSIN HYDROCHLORIDE 0.4 MG/1
0.4 CAPSULE ORAL DAILY
Status: DISCONTINUED | OUTPATIENT
Start: 2018-07-14 | End: 2018-07-15 | Stop reason: HOSPADM

## 2018-07-14 RX ORDER — ACETAMINOPHEN 325 MG/1
650 TABLET ORAL EVERY 4 HOURS PRN
Status: DISCONTINUED | OUTPATIENT
Start: 2018-07-14 | End: 2018-07-15 | Stop reason: HOSPADM

## 2018-07-14 RX ORDER — HALOPERIDOL 5 MG/ML
2 INJECTION INTRAMUSCULAR EVERY 6 HOURS PRN
Status: DISCONTINUED | OUTPATIENT
Start: 2018-07-14 | End: 2018-07-15 | Stop reason: HOSPADM

## 2018-07-14 RX ORDER — ONDANSETRON 4 MG/1
4 TABLET, ORALLY DISINTEGRATING ORAL EVERY 30 MIN PRN
Status: DISCONTINUED | OUTPATIENT
Start: 2018-07-14 | End: 2018-07-15 | Stop reason: HOSPADM

## 2018-07-14 RX ADMIN — LIDOCAINE HYDROCHLORIDE 100 MG: 20 INJECTION, SOLUTION INFILTRATION; PERINEURAL at 02:42

## 2018-07-14 RX ADMIN — ONDANSETRON 4 MG: 2 INJECTION INTRAMUSCULAR; INTRAVENOUS at 02:59

## 2018-07-14 RX ADMIN — Medication 12.5 MG: at 10:36

## 2018-07-14 RX ADMIN — FENTANYL CITRATE 100 MCG: 50 INJECTION, SOLUTION INTRAMUSCULAR; INTRAVENOUS at 02:42

## 2018-07-14 RX ADMIN — PANTOPRAZOLE SODIUM 40 MG: 40 TABLET, DELAYED RELEASE ORAL at 10:36

## 2018-07-14 RX ADMIN — SUGAMMADEX 400 MG: 100 INJECTION, SOLUTION INTRAVENOUS at 03:23

## 2018-07-14 RX ADMIN — ROCURONIUM BROMIDE 90 MG: 10 INJECTION INTRAVENOUS at 02:42

## 2018-07-14 RX ADMIN — POTASSIUM CHLORIDE 40 MEQ: 750 TABLET, EXTENDED RELEASE ORAL at 10:36

## 2018-07-14 RX ADMIN — ACETAMINOPHEN 650 MG: 325 TABLET, FILM COATED ORAL at 10:35

## 2018-07-14 RX ADMIN — PHENYLEPHRINE HYDROCHLORIDE 50 MCG: 10 INJECTION, SOLUTION INTRAMUSCULAR; INTRAVENOUS; SUBCUTANEOUS at 03:12

## 2018-07-14 RX ADMIN — TAMSULOSIN HYDROCHLORIDE 0.4 MG: 0.4 CAPSULE ORAL at 10:36

## 2018-07-14 RX ADMIN — POLYETHYLENE GLYCOL 3350 17 G: 17 POWDER, FOR SOLUTION ORAL at 10:37

## 2018-07-14 RX ADMIN — HYDROXYZINE HYDROCHLORIDE 25 MG: 10 SYRUP ORAL at 14:25

## 2018-07-14 RX ADMIN — SUGAMMADEX 600 MG: 100 INJECTION, SOLUTION INTRAVENOUS at 03:27

## 2018-07-14 RX ADMIN — CLOPIDOGREL 75 MG: 75 TABLET, FILM COATED ORAL at 10:37

## 2018-07-14 RX ADMIN — SODIUM CHLORIDE, POTASSIUM CHLORIDE, SODIUM LACTATE AND CALCIUM CHLORIDE: 600; 310; 30; 20 INJECTION, SOLUTION INTRAVENOUS at 02:42

## 2018-07-14 RX ADMIN — HALOPERIDOL LACTATE 2 MG: 5 INJECTION, SOLUTION INTRAMUSCULAR at 15:48

## 2018-07-14 RX ADMIN — POTASSIUM CHLORIDE AND SODIUM CHLORIDE: 900; 150 INJECTION, SOLUTION INTRAVENOUS at 11:05

## 2018-07-14 RX ADMIN — SODIUM CHLORIDE: 9 INJECTION, SOLUTION INTRAVENOUS at 03:59

## 2018-07-14 RX ADMIN — PROPOFOL 50 MG: 10 INJECTION, EMULSION INTRAVENOUS at 02:42

## 2018-07-14 ASSESSMENT — ACTIVITIES OF DAILY LIVING (ADL)
TOILETING: 2-->ASSISTIVE PERSON
COGNITION: 1 - ATTENTION OR MEMORY DEFICITS
BATHING: 2-->ASSISTIVE PERSON
SWALLOWING: 0-->SWALLOWS FOODS/LIQUIDS WITHOUT DIFFICULTY
AMBULATION: 1-->ASSISTIVE EQUIPMENT
DRESS: 2-->ASSISTIVE PERSON
RETIRED_EATING: 2-->ASSISTIVE PERSON
RETIRED_COMMUNICATION: 2-->DIFFICULTY UNDERSTANDING AND SPEAKING (NOT RELATED TO LANGUAGE BARRIER)
FALL_HISTORY_WITHIN_LAST_SIX_MONTHS: YES
TRANSFERRING: 2-->ASSISTIVE PERSON

## 2018-07-14 ASSESSMENT — ENCOUNTER SYMPTOMS: DYSRHYTHMIAS: 0

## 2018-07-14 NOTE — CONSULTS
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 In the ED CT A/P showing sigmoid volvulus with associated pan colonic distention with no evidence of intra-abdominal free air, pneumatosis or portal vein gas. Patient hemodynamically stable with normal lactate and no further signs of sepsis. We will proceed with sigmoid volvulus reduction. Given high risk for recurrence we strongly recommend surgical intervention prior to discharge.     Recommendations  --Endoscopic intervention for sigmoid volvulus reduction overnight   --Continue ongoing supportive cares  --Consider surgical intervention prior to discharge given risk of recurrence  --We will follow along     Gastroenterology follow up recommendations: SOFYA    Thank you for involving us in this patient's care. Please do not hesitate to contact the GI service with any questions or concerns.     Pt care plan discussed with Dr. Cheung, GI staff physician.    Tremaine Traylor  GI Fellow  -------------------------------------------------------------------------------------------------------------------          Chief Complaint:   We were asked by Dr. Kumar of ED to evaluate this patient with Sigmoid volvulus    History is obtained from the patient and the medical record.          History of Present Illness:   Hardy Acevedo is a 89 year old male with a history of dementia, TIA on plavix, gastric ulcer, BPH, recurrent sigmoid volvulus requiring requiring endoscopic decompression in may 2016 who presented to the ED with abdominal pain, distention as well as constipation. In the ED CT A/P showing sigmoid volvulus with associated pan colonic distention with no  evidence of intra-abdominal free air, pneumatosis or portal vein gas. Patient hemodynamically stable with normal lactate and no further signs of sepsis.  History obtained from Rachele his daughter. Apparently he has been having since this morning abdominal pain and distention with some constipation despite been on miralax and senna-docusate. Patient both prior episodes of sigmoid volvulus had same symptoms and were successfully treated with endoscopic intervention. Patient with dementia so very hard to obtain history from. No fevers, chills or other signs of sepsis. Daughter reports patient had a very small stool this morning.          Past Medical History:   -TIA  -Bladder cancer  -Dementia         Past Surgical History:   -Gastric ulcer         Previous Endoscopy:   No results found for this or any previous visit.         Social History:   Reviewed and edited as appropriate  Social History     Social History     Marital status:      Spouse name: N/A     Number of children: N/A     Years of education: N/A     Occupational History     Not on file.     Social History Main Topics     Smoking status: Not on file     Smokeless tobacco: Not on file     Alcohol use Not on file     Drug use: Not on file     Sexual activity: Not on file     Other Topics Concern     Not on file     Social History Narrative            Family History:   Reviewed and edited as appropriate  No known history of gastrointestinal/liver disease or  gastrointestinal malignancies       Allergies:   Reviewed and edited as appropriate     Allergies   Allergen Reactions     Atorvastatin      Penicillins             Medications:     Current Facility-Administered Medications   Medication     sodium chloride 0.9% infusion     No current outpatient prescriptions on file.             Review of Systems:   A complete review of systems was performed and is negative except as noted in the HPI           Physical Exam:   BP (!) 170/94  Pulse 105  Temp 98.2   F (36.8  C) (Oral)  Resp 16  Wt 71.2 kg (157 lb)  SpO2 97%  Wt:   Wt Readings from Last 2 Encounters:   07/13/18 71.2 kg (157 lb)      Constitutional: cooperative, pleasant, not dyspneic/diaphoretic, no acute distress  Eyes: Sclera anicteric/injected  Ears/nose/mouth/throat: Normal oropharynx without ulcers or exudate, mucus membranes moist, hearing intact  Neck: supple, thyroid normal size  CV: No edema  Respiratory: Unlabored breathing  Lymph: No axillary, submandibular, supraclavicular or inguinal lymphadenopathy  Abd: Significantly distended, +BS, mildly tender diffusely no peritoneal signs.  Skin: warm, perfused, no jaundice  Neuro: Disoriented given dementia although alert, No asterixis  Psych: Normal affect according to daughter   MSK: No gross deformities         Data:   Labs and imaging below were independently reviewed and interpreted    BMP  Recent Labs  Lab 07/13/18 2025      POTASSIUM 3.4   CHLORIDE 107   BRAD 9.2   CO2 23   BUN 26   CR 0.78   *     CBC  Recent Labs  Lab 07/13/18 2025   WBC 6.4   RBC 4.21*   HGB 13.6   HCT 41.3   MCV 98   MCH 32.3   MCHC 32.9   RDW 15.4*        INR  Recent Labs  Lab 07/13/18 2025   INR 1.14     LFTs  Recent Labs  Lab 07/13/18 2025   ALKPHOS 56   AST 14   ALT 16   BILITOTAL 0.7   PROTTOTAL 7.5   ALBUMIN 4.3      PANC  Recent Labs  Lab 07/13/18 2025   LIPASE 353       Imaging:  CT A/P  1. Sigmoid volvulus with associated pan colonic distention. No  evidence of intra-abdominal free air, pneumatosis, or portal venous  gas.  2. Left inguinal hernia containing nonobstructed loops of small bowel.  3. Small amount of pelvic free fluid.   4. Small hiatal hernia.

## 2018-07-14 NOTE — ED PROVIDER NOTES
[facility] EMERGENCY DEPARTMENT ([facility])  7/13/18 ED 9  8:33 PM   History     Chief Complaint   Patient presents with     Abdominal Pain     The history is provided by the patient and a relative. The history is limited by the condition of the patient (dementia).     Hardy Acevedo is a 89 year old male who presents with abdominal distention and constipation. He has a history of dementia and recurrent sigmoid volvulus requiring sigmoidoscopy decompression.  He is brand-new to our system and has no prior medical records here. [facility] He is accompanied by daughter Rachele who provides bulk of history.     Patient states he has not been feeling well. Daughter states he has a history of 2 prior bowel obstructions in the past, requiring procedural intervention into  unkink the hose.  (In review of Care Everywhere, appears to have been sigmoid volvulus, most recently managed w/ sigmoidoscopy.) The patient/facility/family have been trying to prevent this with daily Metamucil and frequent follow-up with patient's nurse practitioner and primary care doctor at [facility], who visit him routinely.  Patient has been struggling with abdominal pain and constipation in spite of taking Metamucil.  Daughter states he did have a Dulcolax suppository and a dose of milk of magnesia earlier today, without any positive results. He normally has his cares through [facility], but they were on divert hence him being sent here for further evaluation. No dysuria.  He does not have nausea nor vomiting, and daughter states that he has never had nausea or vomiting with his prior bowel obstructions.  His symptoms today are very consistent with prior sigmoid volvulus.  Other than surgeries to repair his sigmoid volvulus, his only other surgery was ulcer surgery 55 years ago. No history of lung disease or heart disease. He endorses abdominal pain here. Patient did have a  "portable x-ray done at his nursing home today which revealed \"unchanged dilated air-filled loops of bowel consistent with ileus.  Recommend follow-up as clinically indicated.\"    Daughter states that he is DNR/DNI, has a POLST order in place.     CareEverywhere records were reviewed as well as handcarried documentation from the nursing home.  At [facility] he underwent echocardiogram on 1/5/16 showing:    CONCLUSION:    1. Normal LV size and systolic function.  Mild concentric LVH. (LVEF 60%)   2. Normal RV size and function.    3. No significant valvular abnormality.    4. No change when compared to 2011 report.     I have reviewed the Medications, Allergies, Past Medical and Surgical History, and Social History in the [employer] system.  PAST MEDICAL HISTORY: Dementia, HLP, previous sigmoid volvulus, TIAs, BPH, previous ulcer and GIB  PAST SURGICAL HISTORY: Sigmoidoscopy, surgery for ulcer,    FAMILY HISTORY: No family history on file.    SOCIAL HISTORY:   Social History   Substance Use Topics     Smoking status: Not on file     Smokeless tobacco: Not on file     Alcohol use Not on file       Patient's Medications    No medications on file          Allergies   Allergen Reactions     Atorvastatin      Penicillins       Review of Systems   Unable to perform ROS: Dementia   Constitutional: Negative for fever.   Respiratory: Negative for cough and shortness of breath.    Gastrointestinal: Positive for abdominal distention and constipation. Negative for blood in stool, nausea and vomiting.       Physical Exam   BP: (!) 170/94  Pulse: 105  Temp: 98.2  F (36.8  C)  Resp: 16  SpO2: 97 %      Physical Exam  CONSTITUTIONAL: Well-developed and well-nourished. Awake and alert. Non-toxic appearance. No acute distress.   HENT:   - Head: Normocephalic and atraumatic.   - Ears: Hearing and external ear grossly normal.   - Nose: Nose normal. No rhinorrhea. No epistaxis.   - Mouth/Throat: MMM  EYES: Conjunctivae and lids are normal. " No scleral icterus.   NECK: Normal range of motion and phonation normal. Neck supple.  No tracheal deviation, no stridor. No edema or erythema noted.  CARDIOVASCULAR: Normal rate, regular rhythm and no appreciable abnormal heart sounds.  PULMONARY/CHEST: Normal work of breathing. No accessory muscle usage or stridor. No respiratory distress.  No appreciable abnormal breath sounds.  ABDOMEN: Notably distended and diffusely tender. No pulsatility  MUSCULOSKELETAL: Extremities warm and seemingly well perfused.  NEUROLOGIC: Awake, alert. Not disoriented. Normal tone. No seizure activity. GCS 14 (baseline dementia). No apparent focal deficits.   SKIN: Skin is warm and dry. No rash noted. No diaphoresis. No pallor.   PSYCHIATRIC: Normal mood and affect. Speech and behavior normal. Thought processes linear. Cognition and memory are impaired, consistent with prior history of dementia.      ED Course     ED Course   Comment Time   Called by Radiology, concern for Sigmoid volvulus on CT. Surgery paged. 07/13 2204   Pt had syncopal episode lasting seconds on the bedside comode. Woke quickly and back to baseline. Getting ECG, changed bed to tele bed (PPM updated) Medicine updated. They have no additional requests and are still comfortable taking patient. 07/14 0020     Procedures             EKG Interpretation:      Interpreted by Naikta Kumar MD   Time reviewed: 8:15 PM  Symptoms at time of EKG: abdominal pain   Rhythm: sinus tachycardia  Rate: 108  Axis: Normal  Ectopy: none  Conduction: possible LAE  ST Segments/ T Waves: interior infarct, age undetermined  Comparison to prior: No old EKG available         Labs Ordered and Resulted from Time of ED Arrival Up to the Time of Departure from the ED - No data to display         Assessments & Plan (with Medical Decision Making)   IMPRESSION: 89-year-old male, PMH notable for recurrent sigmoid volvulus (last an issue May 2016, w/ 1 previous surgery for such), dementia, history of  previous TIAs, hiatal hernia, previous gastric ulcer requiring surgery, HLP, BPH, OA, presents today with recurrent abdominal pain & distension consistent in symptomatology to previous sigmoid volvulus/bowel obstructions per family, as described further above in the HPI/ROS.     Clinically, patient appears nontoxic, NAD.  Has slight elevation of HR to the low 100's, but regular HR, and vitals otherwise grossly WNL with some HTN (seemingly asymptomatic from that).  His abdomen is notably distended and diffusely uncomfortable to palpation.  No palpable masses.  No other acute findings on exam outside of his known dementia, difficulty hearing, etc.    DDX includes, but not limited to, bowel obstruction, recurrent volvulus, ileus, significantly severe constipation, other intra-abdominal process such as perforated viscus with a history of ulcer disease, etc.    PLAN: Laboratory studies, urine studies, CT A/P, symptom management as needed  - Patient has POLST in place and is DNR/DNI status. Confirmed status today.     RESULTS:  - Labs: Troponin 0.037, labs otherwise unremarkable  - Imaging: Images and written preliminary reports reviewed by myself and revealed:  --- CT A/P: Sigmoid volvulus    INTERVENTIONS:   - IVF  - GI consult    RE-EVALUATION:  - The patient's symptoms were grossly unchanged. Still managing w/o need for pain medications at rest.   - Repeat troponin increased to 0.068  - Did have brief syncope episode when on bedside comode, lasting seconds, witnessed. No traumas or falls, was help up by staff. I was immediately called to the bedside and pt was already back to baseline. New ECG ordered, 2nd troponin ordered. Medicine updated and still Ok w/ admission. Discussed w/ PPM and they will change admitting bed to one with Tele  - Pt otherwise continues to do well here in the ED, no acute issues or apparent concerning changes in vitals or clinical appearance.     DISCUSSIONS:  - I discussed the care of the  patient with Surgery, IM, GI, MICU, Anesthesia and OR  - w/ Surgery: They defer to GI for management  - w/ IM: Reviewed case, they will admit after procedure to reduce volvulus  - w/ GI: They have seen and evaluated the patient here in the ED and recommend sigmoidoscopy to reduce. They will come in to perform tonight.   - w/ MICU: They prefer procedure to be done in the OR given patient's age.   - w/ Anesthesia/OR: They can have sigmoidoscopy done in OR, GI just needs to call and schedule.   - w/ Patient/his daughter: I have reviewed the available findings, plan with the patient/his daughter.  --- Consent discussion for procedure was completed by the GI team with the patient/daughter, and reviewed our concern about his medical limitations to undergo sedation, etc./survive the procedure, especially with the rising troponin, understanding that it may certainly improve his quality of life. Daughter acknowledges this risk and reports all family know/understand this risk and were expecting him to need a procedure when they brought him to the ED and the potential consequences of such, including but not limited to death.   --- They expressed understanding and agreement with this plan. All questions answered to the best of our ability at this time.     DISPOSITION/PLANNING:  - IMPRESSION: Sigmoid volvulus, syncope on bedside comode in ED  - DISPOSITION: To the OR, admit in for IM presuming procedure goes well  --- Should keep on tele  ______________________________________________________________________________    - I have reviewed the available nursing notes.      New Prescriptions    No medications on file       Final diagnoses:   None     I, Re Finch, am serving as a trained medical scribe to document services personally performed by Nakita Kumar MD based on the provider's statements to me on July 13, 2018.  This document has been checked and approved by the attending provider.    I, Nakita Kumar MD, was  physically present and have reviewed and verified the accuracy of this note documented by Re Finch, medical scribe.     7/13/2018   [facility], [place], EMERGENCY DEPARTMENT     Nakita Kumar MD  07/16/18 0657

## 2018-07-14 NOTE — ANESTHESIA CARE TRANSFER NOTE
Patient: Hardy Acevedo    Procedure(s):  Colonoscopy with sigmoid volvvlus reduction - Wound Class: II-Clean Contaminated    Diagnosis: sigmoid volvulus  Diagnosis Additional Information: No value filed.    Anesthesia Type:   General, RSI, ETT     Note:  Airway :Face Mask  Patient transferred to:PACU  Handoff Report: Identifed the Patient, Identified the Reponsible Provider, Reviewed the pertinent medical history, Discussed the surgical course, Reviewed Intra-OP anesthesia mangement and issues during anesthesia, Set expectations for post-procedure period and Allowed opportunity for questions and acknowledgement of understanding      Vitals: (Last set prior to Anesthesia Care Transfer)    CRNA VITALS  7/14/2018 0303 - 7/14/2018 0339      7/14/2018             Resp Rate (observed): (!)  3                Electronically Signed By: Berenice Ramos MD  July 14, 2018  3:39 AM

## 2018-07-14 NOTE — ED NOTES
Bed: IN06  Expected date: 7/13/18  Expected time: 7:41 PM  Means of arrival: Ambulance  Comments:  CC: surgical abd, h/o recurrent sigmoid volvulouos     ETA:  7:45p ?    Sender/Contact Info: Dr ASPEN Collins ([id number])  Plan: eval     Call taken by: Migue   Patient Name:  Hardy Acevedo [birth year]

## 2018-07-14 NOTE — PROGRESS NOTES
[facility], [place]    Hospitalist Progress Note    Date of Service (when I saw the patient): 07/14/2018    Assessment & Plan     Hardy Acevedo is a 89 year old male  who has a history of dementia, TIAs, hiatal hernia,  HO gastric ulcer SP some kind of surgery 50 years ago, BPH, bladder cancer, ho Melanoma and is admitted for recurrent sigmoid volvulous.         #Voluvlus- resolved    Hemodynamically stable. LA normal. SP Successful endoscopic reduction by GI on 7/13. High risk for reoccurrence.  Keep on a good bowel regimen. GI recommends surgical evaluation. We will discuss with family first     #Dementia: Oriented to self and year, not place or situation. At mental baseline per daughter. Has noticed worsening of dementia following recent nursing home move. Also is hard of hearing. Daughter reports somewhat frequent mild falls and gets out of bed frequently.  -Delirium precautions  -Fall precautions  -has tazodone at NH, we will continue that.     # Rising Troponin, Tachycardia: No documented ho MI. Denies acute chest pain. EKG unchanged from yesterday, no old EKG for comparison. We will speak with daughter about what she wants to do further. Based on prior noted, family not interested in aggressive interventions. Get ECHO. Restart plavix and add metoprolol. Give gentle hydration. Start tele. Could be NSTEMI    -addendum. Spoke with Daughter Rachele. She does not want any further investigation to be done or any other treatment to be done for any other things  Other than the current issue of volvulus.     # Syncope  While in ER: Could be vasovagal. Now troponin going up. Start telee. Get ECHO. Hydrate. Check orthostatics. Get ECHO.     --addendum. Spoke with Daughter Rachele. She does not want any further investigation to be done or any other treatment to be done for any other things  Other than the current issue of volvulus.     # Ho prostate Cancer, BPH: Monitor UO and ability to  void. Continue flomax.   # Hiatal Hernia and ho GERD. Continue protonix.       # Pain Assessment:  Current Pain Score 7/14/2018   Patient currently in pain? denies   Hardy s pain level was assessed and he currently denies pain.          Disposition Plan     Expected discharge: 2 - 3 days; recommended to prior living arrangement once resolution of bowel obstruction.     Entered: Diana Buck 07/14/2018, 6:27 AM      DVT Prophylaxis: Pneumatic Compression Devices  Code Status: DNR/DNI    Disposition: DC tomorrow     Lyndon Hua MD    Interval History     Dementia, does not make sense most of the time. Eating okay per nursing. Daughter reports that he ambulates okay with walker at NH      -Data reviewed today: I reviewed all new labs and imaging results over the last 24 hours. I personally reviewed the EKG tracing showing tachycardia, non specific STT changes. .    Physical Exam   Temp: 98  F (36.7  C) Temp src: Axillary BP: 143/69 Pulse: 107 Heart Rate: 105 Resp: 18 SpO2: 95 % O2 Device: Nasal cannula Oxygen Delivery: 2 LPM  Vitals:    07/13/18 2133   Weight: 71.2 kg (157 lb)     Vital Signs with Ranges  Temp:  [97  F (36.1  C)-99.5  F (37.5  C)] 98  F (36.7  C)  Pulse:  [105-107] 107  Heart Rate:  [] 105  Resp:  [15-20] 18  BP: ()/(52-94) 143/69  SpO2:  [93 %-98 %] 95 %  I/O last 3 completed shifts:  In: 670 [I.V.:670]  Out: 0     Constitutional:  Awake, alert, dementia, confused  Respiratory: Clear to auscultation bilaterally, no crackles or wheezing  Cardiovascular: Regular rate and rhythm, normal S1 and S2, and no murmur noted  GI: diminihsed bowel sounds, soft, non-distended, non-tender  Skin/Integumen: No rashes, no cyanosis, no edema    Medications     - MEDICATION INSTRUCTIONS -           Data     Recent Labs  Lab 07/14/18  0806 07/14/18  0056 07/13/18 2025   WBC 14.5*  --  6.4   HGB 13.0* 13.4 13.6   MCV 98  --  98     --  191   INR  --   --  1.14     --  141   POTASSIUM 3.2*   --  3.4   CHLORIDE 108  --  107   CO2 23  --  23   BUN 28  --  26   CR 0.86  --  0.78   ANIONGAP 12  --  11   BRAD 9.1  --  9.2   *  --  144*   ALBUMIN 4.0  --  4.3   PROTTOTAL 7.3  --  7.5   BILITOTAL 0.6  --  0.7   ALKPHOS 52  --  56   ALT 17  --  16   AST 22  --  14   LIPASE  --   --  353   TROPI 0.107* 0.068* 0.037       Recent Results (from the past 24 hour(s))   CT Abdomen Pelvis w Contrast    Narrative    EXAMINATION: CT ABDOMEN PELVIS W CONTRAST, 7/13/2018 10:00 PM    TECHNIQUE: Helical CT images from the lung bases through the symphysis  pubis were obtained with IV contrast. Contrast dose: 78cc isovue 370    COMPARISON: None available. Outside dictation 9/23/2016.    HISTORY: ? bowel obstruction, abd distension, pain    FINDINGS:    Abdomen and pelvis:   There is abnormal spiraling of the superior rectum with twisting of  the sigmoid colon. There is marked distention of the colon, including  the twisted sigmoid colon, measuring up to 10.6 cm in diameter. The  small bowel is not significantly distended. Loops of small bowel in  the left lower quadrant extend into a left inguinal hernia. The  distended colon has significant mass effect on the stomach. No  evidence of pneumatosis, portal venous gas, or intra-abdominal free  air.    1.5 cm enhancing lesion in hepatic segment 48, at the dome, series 5  image 38, likely representing a hemangioma. Otherwise, unremarkable  liver parenchyma. Gallbladder, spleen, partially atrophic pancreas,  and adrenal glands appear normal. Small splenule anterior to the  splenic hilum. Mild age-related dilation of common bile duct.  Scattered too small to characterize hypodensities in the kidneys  bilaterally and a simple renal cyst in the anterior right kidney. No  hydronephrosis. Small amount of pelvic free fluid. The abdominal aorta  is tortuous, coursing to the right of the IVC. The major abdominal  vasculature is patent. No lymphadenopathy in the abdomen or  pelvis.    Lung bases:   The heart is nonenlarged. Extensive atherosclerotic calcifications. No  pericardial effusion. Small to moderate sliding hiatal hernia.  Bibasilar atelectasis.    Bones and soft tissues:   Straightening of the normal lumbar lordosis with multilevel  spondylosis. No acute or worrisome osseous lesions. Old lateral right  rib fractures.        Impression    IMPRESSION:   1. Sigmoid volvulus with associated pan colonic distention. No  evidence of intra-abdominal free air, pneumatosis, or portal venous  gas.  2. Left inguinal hernia containing nonobstructed loops of small bowel.  3. Small amount of pelvic free fluid.   4. Small to moderate sliding hiatal hernia.      [Result: Sigmoid volvulus]    Finding was identified on 7/13/2018 10:04 PM.     Dr. Kumar was contacted by Dr. Dyer at 7/13/2018 10:10 PM and  verbalized understanding of the urgent finding.     I have personally reviewed the examination and initial interpretation  and I agree with the findings.    MARYELLEN DEWEY MD   XR Abdomen Port 1 View    Narrative    Persistent gaseous distention of the colon although less than on  previous CT. Recommend short-term follow-up radiographs.

## 2018-07-14 NOTE — ANESTHESIA PREPROCEDURE EVALUATION
Anesthesia Evaluation     . Pt has had prior anesthetic.     No history of anesthetic complications          ROS/MED HX    ENT/Pulmonary:  - neg pulmonary ROS     Neurologic:     (+)dementia, TIA     Cardiovascular: Comment: Troponin leak in setting of tachycardia, HTN.    (+) hypertension----. : . . fainting (syncope). :. . Previous cardiac testing Echodate:2016results:No WMA. Normal EF. No valvulopathies.date: results: date: results: date: results:         (-) CAD, CHF, arrhythmias and valvular problems/murmurs   METS/Exercise Tolerance:     Hematologic:  - neg hematologic  ROS       Musculoskeletal:   (+) arthritis, , , -       GI/Hepatic:     (+) hiatal hernia, Other GI/Hepatic sigmoid volvulus, distended abdomen      Renal/Genitourinary:     (+) BPH,       Endo:  - neg endo ROS       Psychiatric:  - neg psychiatric ROS       Infectious Disease:  - neg infectious disease ROS       Malignancy:         Other:                     Physical Exam  Normal systems: pulmonary    Airway   Mallampati: II  TM distance: >3 FB  Neck ROM: limited    Dental     Cardiovascular   Rhythm and rate: regular and abnormal  (-) carotid bruit is not present and no murmur  PE comment: Sinus tach    Pulmonary                     Anesthesia Plan      History & Physical Review      ASA Status:  3 emergent.    NPO Status:  Waived due to emergency (aspiration risk)    Plan for General, RSI and ETT with Intravenous induction. Maintenance will be Balanced.    PONV prophylaxis:  Ondansetron (or other 5HT-3) and Dexamethasone or Solumedrol (propofol on induction, Sea Bands)       Postoperative Care  Postoperative pain management:  IV analgesics and Multi-modal analgesia.      Consents  Anesthetic plan, risks, benefits and alternatives discussed with:  Implied consent/emergency..      Late entry note due to emergent need for immediate patient care.

## 2018-07-14 NOTE — PROGRESS NOTES
Gastroenterology Inpatient Sign Off Note    Inpatient GI consults service will sign off. No further recommendations at this time. If primary team has addition questions, please page consult fellow listed in [facility]. Successful sigmoid decompression through sigmoidoscopy. Patient with large BM this am. No further recs. We strongly recommend sigmoidectomy prior to discharge given high risk of recurrence.    Current GI Consult Staff: Dr. Cheung     Follow up recommendations:   No outpatient GI clinic follow-up indicated. Follow-up with primary care provider at timing determined by discharge physician.    Tremaine Traylor  GI Fellow

## 2018-07-14 NOTE — OR NURSING
Pt's daughters glasses found on the bed in PACU, will send to 7B. Pt's daughter, Rachele was notified via telephone.

## 2018-07-14 NOTE — OR NURSING
Dr. Traylor text paged: abdominal xray complete and patient  asking for a bed request order.       Dr. Traylor verbalized xray looks ok, pt may transfer to floor when ready.

## 2018-07-14 NOTE — H&P
"[facility], [place]    Internal Medicine History and Physical - [facility] Service       Date of Admission:  7/13/2018    Chief Complaint   Constipation, ABD pain    Hx obtained from daughter Rachele and EMR    History of Present Illness   Hardy Acevedo is a 89 year old male  who has a history of dementia, TIAS, hiatal hernia, gastric ulcer, BPH, bladder cnacer, and is admitted for recurrent sigmoid volvulous.     Patient feels unwell. Hx of 2 prior bowel obstructions, most recent in May 2016. . Abdominal pain, distention for unknown period of time, lives at [facility], per daughter they did not report any known symptoms.  Ongoing constipation despite metamucil. Had dulcolax and milk of magnesia on 7/13 with very small BM.    Had episode of loss of consciousness in ED. Per daughter, he has episodes of \"zoning out\". Was told likely 2/2 dementia or possible TIAs. Family does not want further work up and instructs nursing home not to bring patient to ED for these episodes.    ABD XR at nursing home showed \"unchanged dilated air-filled loops of bowel consistent with ileus.\"  ABD CT  1. Sigmoid volvulus with associated pan colonic distention. No  evidence of intra-abdominal free air, pneumatosis, or portal venous  gas.  2. Left inguinal hernia containing nonobstructed loops of small bowel.  3. Small amount of pelvic free fluid.   4. Small hiatal hernia.    Was seen by GI, they successfully reduced volvulus.     DNR/DNI, has polst       Review of Systems   The 10 point Review of Systems is negative other than noted in the HPI or here.     Past Medical History    I have reviewed this patient's medical history and updated it with pertinent information if needed.   Past Medical History:   Diagnosis Date     Bladder cancer (H)      BPH (benign prostatic hyperplasia)      Dementia      TIA (transient ischemic attack)      Volvulus (H)     recurrent        Past Surgical History   I have " reviewed this patient's surgical history and updated it with pertinent information if needed.  Sigmoid volvulous repair endoscopically  Ulcer repair surgery 55 years ago    Social History   Lives At [facility]    Family History   Family history reviewed with patient and is noncontributory.    Prior to Admission Medications   None     Allergies   Allergies   Allergen Reactions     Atorvastatin      Penicillins        Physical Exam   Vital Signs: Temp: 97  F (36.1  C) Temp src: Axillary BP: 145/67 Pulse: 105 Heart Rate: 109 Resp: 18 SpO2: 96 % O2 Device: None (Room air) Oxygen Delivery: 2 LPM  Weight: 157 lbs 0 oz    General Appearance: lying in bed comfortably  Eyes: EOM intact, no scleral icterus  HEENT: MMM  Respiratory: CTAB  Cardiovascular: RRR no murmur  GI: distended, soft, mildly tender to palpation  Genitourinary: no suprapubic tenderness  Skin: Patient declined skin exam, prefers dark room   Musculoskeletal: normal bulk and tone  Neurologic: alert to self and year, not to situation or place  Psychiatric: pleasant, appropriate affect and behavior, easily redirectable    Assessment & Plan   Hardy Acevedo is a 89 year old male  who has a history of dementia, TIAS, hiatal hernia, gastric ulcer, BPH, bladder cnacer, and is admitted for recurrent sigmoid volvulous.       #Voluvlus- resolved  Hemodynamically stable. LA normal. Successful endoscopic reduction. High risk for reoccurrence.  -Hold Plavix.   -GI consult  -Recs;   -Clear liquid diet   -AXR x2   -miralax Daily 1-2 BM daily   -ambulate   -avoid narcotics   -monitor stool output   -high risk for reoccurrence, consider sigmoidectomy.    #Dementia  Oriented to self and year, not place or situation. At mental baseline per daughter. Has noticed worsening of dementia following recent nursing home move. Also is hard of hearing. Daughter reports somewhat frequent mild falls and gets out of bed frequently.  -Delirium precautions  -Fall precautions    #  Pain Assessment:  Current Pain Score 7/14/2018   Patient currently in pain? gaby hensley pain level was assessed and he currently denies pain.      Diet: Clear Liquid Diet  Fluids: Ns 100ml/hr d/c order at 0800 to reasess  DVT Prophylaxis: mechanical, post procedural  Code Status: DNR / DNI    Disposition Plan   Expected discharge: 2 - 3 days; recommended to prior living arrangement once resolution of bowel obstruction.     Entered: Diana Buck 07/14/2018, 6:27 AM   Information in the above section will display in the discharge planner report.    To be staffed in     Diana Buck  [facility]   Pager: [phone]  Please see sticky note for cross cover information      Data   Data     Recent Labs  Lab 07/14/18  0056 07/13/18 2025   WBC  --  6.4   HGB 13.4 13.6   MCV  --  98   PLT  --  191   INR  --  1.14   NA  --  141   POTASSIUM  --  3.4   CHLORIDE  --  107   CO2  --  23   BUN  --  26   CR  --  0.78   ANIONGAP  --  11   BRAD  --  9.2   GLC  --  144*   ALBUMIN  --  4.3   PROTTOTAL  --  7.5   BILITOTAL  --  0.7   ALKPHOS  --  56   ALT  --  16   AST  --  14   LIPASE  --  353   TROPI 0.068* 0.037     Recent Results (from the past 24 hour(s))   CT Abdomen Pelvis w Contrast    Narrative    1. Sigmoid volvulus with associated pan colonic distention. No  evidence of intra-abdominal free air, pneumatosis, or portal venous  gas.  2. Left inguinal hernia containing nonobstructed loops of small bowel.  3. Small amount of pelvic free fluid.   4. Small hiatal hernia.    [Result: Sigmoid volvulus]    Finding was identified on 7/13/2018 10:04 PM.     Dr. Kumar was contacted by Dr. Dyer at 7/13/2018 10:10 PM and  verbalized understanding of the urgent finding.    XR Abdomen Port 1 View    Narrative    Persistent gaseous distention of the colon although less than on  previous CT. Recommend short-term follow-up radiographs.

## 2018-07-14 NOTE — PROGRESS NOTES
Colorectal Surgery Progress Note    Hardy Acevedo  [id number]    Volvulus successfully reduced endoscopically overnight. Afebrile overnight. States that he thinks he has been passing flatus. No nausea or emesis. Some hiccups this morning.    Temp:  [97  F (36.1  C)-98.2  F (36.8  C)] 97  F (36.1  C)  Pulse:  [105] 105  Heart Rate:  [] 109  Resp:  [15-20] 18  BP: ()/(52-94) 145/67  SpO2:  [94 %-98 %] 96 %    Intake/Output Summary (Last 24 hours) at 07/14/18 0658  Last data filed at 07/14/18 0441   Gross per 24 hour   Intake              670 ml   Output                0 ml   Net              670 ml     NAD, sitting comfortably in bed  non-labored breathing on RA  soft, much less distended, less tender    WBC 6.4  Cr 0.78  Trop 0.037 -> 0.068    89 year old male with hx of TIA on plavix and dementia who presents with sigmoid volvulus, s/p successful endoscopic decompression overnight.    - No indication for emergent surgical intervention  - Ongoing discussions about potential operative intervention given high risk of recurrence vs non-op mgmt alone in the setting of older age, long periods between prior recurrences, and family preference- anticipate conservative mgmt  - Please HOLD plavix for now    Seen with colorectal fellow, who will discuss with staff.     Oralia Reilly MD  General Surgery PGY-3

## 2018-07-14 NOTE — PROGRESS NOTES
Nursing Focus: Admission  D: Arrived at 0530 from PACU post colonoscopy via wheelchair. Admitted for recurring sigmoid volvulus. Pt came to hospital from memory care unit.    I: Admission process began.  Patient oriented to room, enviroment, call light.  Md. notified of patients arrival on unit.     A: Vital signs stable, afebrile, sating in upper 90s on RA. Pt refusing to keep O2 monitor on, pt also refusing to keep capno NC monitor on. Patient stable at this time.  Denies pain. Pt confused, disoriented x 4. Pt can quickly become agitated. Pt sitting on chair or on bed. Attendant present. Pt's daughter will be here today.    P: Implement plan of care when available. Continue to monitor patient. Nursing interventions as appropriate. Notify md with changes in pt status. Cardiac monitoring ordered, MD notified, per MD no need for tele, MD will discontinue order. Pt needs strict I&O of stool output.

## 2018-07-14 NOTE — ED NOTES
[facility], [place]   ED Nurse to Floor Handoff     Hardy Acevedo is a 89 year old male who speaks English and lives with others,  in a nursing home  They arrived in the ED by ambulance from [facility]    ED Chief Complaint: Abdominal Pain    ED Dx;   Final diagnoses:   Sigmoid volvulus (H)         Needed?: No    Allergies:   Allergies   Allergen Reactions     Atorvastatin      Penicillins    .  Past Medical Hx: No past medical history on file.   Baseline Mental status: other Dementia, able to answer questions, lives in a Memory Care Unit  Current Mental Status changes: at basesline    Infection present or suspected this encounter: no  Sepsis suspected: No  Isolation type: No active isolations     Activity level - Baseline/Home:  Stand with Assist  Activity Level - Current:   Stand with Assist    Bariatric equipment needed?: No    In the ED these meds were given:   Medications   sodium chloride 0.9% infusion (not administered)   sodium chloride (PF) 0.9% PF flush 74 mL (74 mLs Intravenous Given 7/13/18 2151)   iopamidol (ISOVUE-370) solution 78 mL (78 mLs Intravenous Given 7/13/18 2151)       Drips running?  No    Home pump  No    Current LDAs  Peripheral IV 07/13/18 Right Lower forearm (Active)   Number of days:0       Labs results:   Labs Ordered and Resulted from Time of ED Arrival Up to the Time of Departure from the ED   CBC WITH PLATELETS DIFFERENTIAL - Abnormal; Notable for the following:        Result Value    RBC Count 4.21 (*)     RDW 15.4 (*)     All other components within normal limits   COMPREHENSIVE METABOLIC PANEL - Abnormal; Notable for the following:     Glucose 144 (*)     All other components within normal limits   ROUTINE UA WITH MICROSCOPIC - Abnormal; Notable for the following:     Ketones Urine 10 (*)     Protein Albumin Urine 30 (*)     Mucous Urine Present (*)     All other components within normal limits   INR   LIPASE   LACTIC ACID WHOLE BLOOD    TROPONIN I   TSH WITH FREE T4 REFLEX       Imaging Studies:   Recent Results (from the past 24 hour(s))   CT Abdomen Pelvis w Contrast    Narrative    1. Sigmoid volvulus with associated pan colonic distention. No  evidence of intra-abdominal free air, pneumatosis, or portal venous  gas.  2. Left inguinal hernia containing nonobstructed loops of small bowel.  3. Small amount of pelvic free fluid.   4. Small hiatal hernia.    [Result: Sigmoid volvulus]    Finding was identified on 7/13/2018 10:04 PM.     Dr. Kumar was contacted by Dr. Dyer at 7/13/2018 10:10 PM and  verbalized understanding of the urgent finding.        Recent vital signs:   BP (!) 170/94  Pulse 105  Temp 98.2  F (36.8  C) (Oral)  Resp 16  Wt 71.2 kg (157 lb)  SpO2 97%    Cardiac Rhythm: Tachycardia  Pt needs tele? Yes  Skin/wound Issues: None    Code Status: DNR / DNI    Pain control: fair    Nausea control: good    Abnormal labs/tests/findings requiring intervention: See Epic, especially CT scan    Family present during ED course? Yes   Family Comments/Social Situation comments: Daughter, she will bring DNR/DNI original document in the morning. Pt will need a sitter    Tasks needing completion: None    Matthew Loredo RN  asc-- [phone] [place] [facility]  [phone] [facility] ED

## 2018-07-14 NOTE — OR NURSING
Pt being transferred with Capnography, however pt not tolerating nasal canula; continues to pull NC off face.

## 2018-07-14 NOTE — ANESTHESIA POSTPROCEDURE EVALUATION
Patient: Hardy Acevedo    Procedure(s):  Colonoscopy with sigmoid volvvlus reduction - Wound Class: II-Clean Contaminated    Diagnosis:sigmoid volvulus  Diagnosis Additional Information: No value filed.    Anesthesia Type:  General, RSI, ETT    Note:  Anesthesia Post Evaluation    Patient location during evaluation: PACU  Patient participation: Able to fully participate in evaluation  Level of consciousness: awake and alert  Pain management: satisfactory to patient  Airway patency: patent  Cardiovascular status: acceptable and stable  Respiratory status: acceptable and spontaneous ventilation  Hydration status: euvolemic  PONV: controlled     Anesthetic complications: None          Last vitals:  Vitals:    07/14/18 0045 07/14/18 0215 07/14/18 0237   BP:      Pulse:      Resp: 16 15    Temp:      SpO2:   98%         Electronically Signed By: Elías Qiu MD  July 14, 2018  3:45 AM

## 2018-07-14 NOTE — CONSULTS
Colorectal Surgery Consult Note    Hardy Acevedo MRN# [id number]     Date of Admission: 7/13/2018    CC: abdominal pain, distension    HPI: 89 year old male with hx of TIA on Plavix, dementia and 2 prior episodes of sigmoid volvulus who presents with abdominal pain and distension. He moved to a memory care unit about 2 weeks ago, and his home RN came to visit today. She was concerned RE distension which prompted trip to ED. No fevers or chills. Denies nausea or emesis. Has been tolerating diet prior to this. Struggles with chronic constipation, on miralax QD, and sometimes receives MOM and suppositories.     He is here with his daughter who provided significant portion of history. She reports that he has had two prior episodes of sigmoid volvulus (2014 and 2016), both times resolved after endoscopic decompression. Surgical intervention was not pursued in this situations in the setting of advanced age. He has been on miralax for bowel regimen as mentioned above, and family is unsure of how diligently he has been taking this (especially since he was staying in assisted living home prior to 2 weeks ago). Patient and family are interested in minimally invasive approach to care, however would like to discuss surgical options further if felt he could tolerate operation well. Code status was confirmed to be DNR/DNI.    Past Medical History:  TIA on plavix  Age related dementia  Bladder cancer s/p TURP  Peptic ulcer disease   Benign prostatic hyperplasia  Small hiatal hernia  Osteoarthritis    Past Surgical History:  Open pyloroplasty and vagotomy for duodenal ulcer  Bilateral inguinal hernia repair  Varicose vein surgery  TURP for bladder cancer  Left elbow basal cell carcinoma excision    Allergies:  Atorvastatin  Penicillins    Medications:  Plavix  Miralax   Flomax  Simvastatin  Protonix  Tylenol  Trazodone    Social History:  Former smoker, quit 1955  Minimal etoh use  Recently switched homes from assisted  living to memory care type of housing about 2 weeks ago  Has 2 sons and 1 daughter who are closely involved in his care    Family History:  Father - colon cancer  Brother - Alzheimer's  Brother - Parkinson's    ROS:  CONSTITUTIONAL: no fatigue, no unexpected change in weight  SKIN: no worrisome rashes, no worrisome moles, no worrisome lesions  ENT: no ear problems, no mouth problems, no throat problems  RESP: no significant cough, no shortness of breath  CV: no chest pain, no palpitations, no new or worsening peripheral edema  GI: no nausea, no vomiting, +constipation, no hematochezia or melena  : no frequency, no dysuria, no hematuria  MS: no claudication, no myalgias, no joint aches  ENDOCRINE: no temperature intolerance, no skin/hair changes  HEME: no easy bruising, no bleeding problems    Exam:  BP (!) 170/94  Pulse 105  Temp 98.2  F (36.8  C) (Oral)  Resp 16  Wt 71.2 kg (157 lb)  SpO2 97%  No acute distress, resting comfortably  Non labored respirations on room air  Mild tachycardia, regular rhythm, peripheral pulses 2+  Soft, very distended, +tympany, lower abdomen tender to palpation, no rebound, no guarding, no peritonitis  Extremities warm, well perfused  Pleasantly confused    Labs:   Wbc 6.4  Hgb 13.6  INR 1.14  Cr 0.78  Trop 0.037  LA 1.5    Imaging:   CT abd/pel 7/13 with sigmoid volvulus, colon massively distended, no free air, pneumatosis or portal venous gas, non-obstructing loop of bowel in left inguinal hernia, small hiatal hernia.    Assessment: 89 year old male with hx TIA on plavix and dementia who presents with his third episode of sigmoid volvulus. HDS, no peritonitis, no evidence of pneumatosis or perforation on CT.    Plan:   - No indication for emergent surgical intervention  - Recommend admission to medicine service   - GI consult to consider endoscopic decompression    - Ongoing discussions about potential operative intervention given high risk of recurrence vs non-op mgmt alone in  the setting of older age and long periods between prior recurrences with patient/family  - Please HOLD plavix     Discussed with colorectal fellow, who will discuss with staff.     Oralia Reilly MD  General Surgery PGY-3    Staff Addendum:  Agree with the consultation H&P as documented by the housestaff. I was personally involved with the recommendations made by our service for this patient.  Sigmoid volvulus in very elderly patient. He does not want to consider surgery.  Planning for gastroenterology to flexible sigmoidoscopy patient for detorsion.    Serenity Saldana MD  Colon and Rectal Surgery Staff  [facility]

## 2018-07-14 NOTE — PROCEDURES
Gastroenterology Endoscopy Brief Operative Note    Procedure:  Flexible sigmoidoscopy   Post-operative diagnosis:  Sigmoid volvulus   Staff Physician:  Dr. Marco A Cheung   Fellow/Assistant(s):  Dr. Tremaine Traylor    Specimens:  Please see final procedure note for further details.   Findings:  Sigmoid volvulus. Successully reduced.   Complications:  None.   Condition:  Stable   Recommendations  Diet:  Clear liquid diet and advanced as tolerated in AM  PPI:  N/A  Anti-coagulants/platelets:  N/A  Octreotide:  N/A  Additional recommendations:   -Admit to medicine  -Obtain AXR 2 view to confirm decompression  -Miralax at least once daily to help have 1-2 soft BMs daily  -Ambulate/move in bed  -Avoid narcotics  -Monitor stool output  -He is at high risk for recurrence of volvulus (this is his 3rd episode since 2016. Consider sigmoidectomy prior to discharge.

## 2018-07-15 VITALS
HEART RATE: 85 BPM | WEIGHT: 158 LBS | TEMPERATURE: 96.3 F | SYSTOLIC BLOOD PRESSURE: 95 MMHG | OXYGEN SATURATION: 97 % | RESPIRATION RATE: 18 BRPM | DIASTOLIC BLOOD PRESSURE: 58 MMHG

## 2018-07-15 LAB
ANION GAP SERPL CALCULATED.3IONS-SCNC: 8 MMOL/L (ref 3–14)
BUN SERPL-MCNC: 30 MG/DL (ref 7–30)
CALCIUM SERPL-MCNC: 8.3 MG/DL (ref 8.5–10.1)
CHLORIDE SERPL-SCNC: 114 MMOL/L (ref 94–109)
CO2 SERPL-SCNC: 23 MMOL/L (ref 20–32)
CREAT SERPL-MCNC: 0.84 MG/DL (ref 0.66–1.25)
GFR SERPL CREATININE-BSD FRML MDRD: 85 ML/MIN/1.7M2
GLUCOSE SERPL-MCNC: 92 MG/DL (ref 70–99)
INTERPRETATION ECG - MUSE: NORMAL
POTASSIUM SERPL-SCNC: 3.6 MMOL/L (ref 3.4–5.3)
SODIUM SERPL-SCNC: 145 MMOL/L (ref 133–144)

## 2018-07-15 PROCEDURE — 36415 COLL VENOUS BLD VENIPUNCTURE: CPT | Performed by: HOSPITALIST

## 2018-07-15 PROCEDURE — 25000132 ZZH RX MED GY IP 250 OP 250 PS 637: Performed by: HOSPITALIST

## 2018-07-15 PROCEDURE — 80048 BASIC METABOLIC PNL TOTAL CA: CPT | Performed by: HOSPITALIST

## 2018-07-15 PROCEDURE — 99238 HOSP IP/OBS DSCHRG MGMT 30/<: CPT | Performed by: HOSPITALIST

## 2018-07-15 RX ORDER — HYDROXYZINE HCL 10 MG/5 ML
25 SOLUTION, ORAL ORAL EVERY 6 HOURS PRN
Qty: 450 ML | Refills: 0 | Status: SHIPPED | OUTPATIENT
Start: 2018-07-15

## 2018-07-15 RX ORDER — PANTOPRAZOLE SODIUM 40 MG/1
40 TABLET, DELAYED RELEASE ORAL
Qty: 30 TABLET
Start: 2018-07-16

## 2018-07-15 RX ORDER — SENNOSIDES 8.6 MG
2 TABLET ORAL 2 TIMES DAILY
Qty: 120 EACH | Refills: 0
Start: 2018-07-15

## 2018-07-15 RX ORDER — CLOPIDOGREL BISULFATE 75 MG/1
75 TABLET ORAL DAILY
Qty: 30 TABLET
Start: 2018-07-16

## 2018-07-15 RX ORDER — TAMSULOSIN HYDROCHLORIDE 0.4 MG/1
0.4 CAPSULE ORAL DAILY
Qty: 60 CAPSULE | Refills: 0
Start: 2018-07-16

## 2018-07-15 RX ORDER — ACETAMINOPHEN 325 MG/1
650 TABLET ORAL EVERY 6 HOURS PRN
Qty: 100 TABLET | Refills: 0
Start: 2018-07-15

## 2018-07-15 RX ORDER — POLYETHYLENE GLYCOL 3350 17 G/17G
17 POWDER, FOR SOLUTION ORAL 2 TIMES DAILY
Qty: 7 PACKET
Start: 2018-07-15

## 2018-07-15 RX ADMIN — CLOPIDOGREL 75 MG: 75 TABLET, FILM COATED ORAL at 09:20

## 2018-07-15 RX ADMIN — TAMSULOSIN HYDROCHLORIDE 0.4 MG: 0.4 CAPSULE ORAL at 09:20

## 2018-07-15 RX ADMIN — POLYETHYLENE GLYCOL 3350 17 G: 17 POWDER, FOR SOLUTION ORAL at 09:19

## 2018-07-15 RX ADMIN — PANTOPRAZOLE SODIUM 40 MG: 40 TABLET, DELAYED RELEASE ORAL at 09:19

## 2018-07-15 NOTE — PLAN OF CARE
Problem: Constipation (Adult)  Goal: Effective Bowel Elimination  Patient will demonstrate the desired outcomes by discharge/transition of care.   Pt AxOx0. VSS  BP 91/57 (BP Location: Left arm)  Pulse 99  Temp 96.8  F (36  C) (Oral)  Resp 16  Wt 71.7 kg (158 lb)  SpO2 98%  Pt has dementia. Sitter in the room. Agitated and combative at times. Haldol given x1. Atarax given at the end of AM shift. Pt refused all meds at bedtime. He rested after his haldol and is resting now. Did not eat. No BM. Laxatives refused at HS. Voiding minimally. Fluids were DC. Pass on to address tomorrow with primary team. Potassium 3.2 on am shift. MD aware. Bed alarm on. Daughter her for the afternoon. Left in the evening. Stated we can call her anytime day or night.

## 2018-07-15 NOTE — PROGRESS NOTES
Social Work Services Discharge Note      Patient Name:  Hardy Acevedo     Anticipated Discharge Date:  7/15/2018    Discharge Disposition:   LTC:  Return to [facility]  ([phone] f: [phone])    Following MD:  Per facility      Pre-Admission Screening (PAS) online form has been completed.  The Level of Care (LOC) is:  Determined  Confirmation Code is:  N/A  Patient/caregiver informed of referral to [facility] for Pre-Admission Screening for skilled nursing facility (SNF) placement and to expect a phone call post discharge from SNF.     Additional Services/Equipment Arranged:  [facility] stretcher transport due to impulsiveness and behaviors during admission-this was per request of [facility] after writer reviewed case with them. Transport time is 1300. PCS form completed and given to RN. Pt's dtr informed of possibility transport might not be covered by insurance and expressed no financial concerns and agrees that this is the safest way to transport pt.      Patient / Family response to discharge plan: Pt and dtr, Rachele, whom is at bedside. Both are in agreement w/ plan to discharge back to facility today     Persons notified of above discharge plan:  [facility], bedside RN, pt and dtr     Staff Discharge Instructions:  Please fax discharge orders and signed hard scripts for any controlled substances.  Please print a packet and send with patient.     CTS Handoff completed:  NO    Medicare Notice of Rights provided to the patient/family:  YES-Dtr signed as pt is unable

## 2018-07-15 NOTE — DISCHARGE SUMMARY
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 This is 3rd epidose. SP sigmoidoscopy and resolution of it on scope  # Syncope while on the commode, likely vasovagal  # Demand ischemia, family refused further evaluation  # Agitation associated with ho #Dementia    PMH:     History of dementia   TIAs  Hiatal hernia  HO gastric ulcer SP some kind of surgery 50 years ago   BPH   Bladder cancer   Ho Melanoma    Past Medical History:   Diagnosis Date     Bladder cancer (H)      BPH (benign prostatic hyperplasia)      Dementia      TIA (transient ischemic attack)      Volvulus (H)     recurrent                  Discharge Medications:     Current Discharge Medication List      START taking these medications    Details   acetaminophen (TYLENOL) 325 MG tablet Take 2 tablets (650 mg) by mouth every 6 hours as needed for mild pain  Qty: 100 tablet, Refills: 0    Associated Diagnoses: Sigmoid volvulus (H)      clopidogrel (PLAVIX) 75 MG tablet Take 1 tablet (75 mg) by mouth daily  Qty: 30 tablet    Associated Diagnoses: History of TIA (transient ischemic attack) and stroke      hydrOXYzine (ATARAX) 10 MG/5ML syrup Take 12.5 mLs (25 mg) by mouth every 6 hours as needed for anxiety (agitation)  Qty: 450 mL, Refills: 0    Associated Diagnoses: Agitation      magnesium hydroxide (MILK OF MAGNESIA) 400 MG/5ML suspension Take 15 mLs by mouth daily as needed for constipation  Qty: 105 mL, Refills: 0    Associated Diagnoses: Sigmoid volvulus (H)      melatonin 1 MG TABS tablet Take 1 tablet  (1 mg) by mouth nightly as needed for sleep    Associated Diagnoses: Insomnia, unspecified type      pantoprazole (PROTONIX) 40 MG EC tablet Take 1 tablet (40 mg) by mouth every morning (before breakfast)  Qty: 30 tablet    Associated Diagnoses: Gastroesophageal reflux disease without esophagitis      polyethylene glycol (MIRALAX/GLYCOLAX) Packet Take 17 g by mouth 2 times daily  Qty: 7 packet    Associated Diagnoses: Sigmoid volvulus (H)      sennosides (SENOKOT) 8.6 MG tablet Take 2 tablets by mouth 2 times daily  Qty: 120 each, Refills: 0    Associated Diagnoses: Sigmoid volvulus (H)      tamsulosin (FLOMAX) 0.4 MG capsule Take 1 capsule (0.4 mg) by mouth daily  Qty: 60 capsule, Refills: 0    Associated Diagnoses: Benign prostatic hyperplasia with lower urinary tract symptoms, symptom details unspecified      traZODone (DESYREL) 5 mg/ml SUSP Take 2.5 mLs (12.5 mg) by mouth At Bedtime    Associated Diagnoses: Agitation; Insomnia, unspecified type                  Hospital Course:     Hardy Acevedo is a 89 year old male  who has a history of dementia, TIAs, hiatal hernia, HO gastric ulcer SP some kind of surgery 50 years ago, BPH, bladder cancer, ho Melanoma and is admitted for abdominal pain, distention, no BM, Found to have  recurrent sigmoid volvulous on CT abdomen and pelvis.       # Recurrent Voluvlus-  Now resolved on sigmoidoscopy    SP Successful endoscopic reduction by GI on 7/13. He has High risk for reoccurrence. They recummended getting surgery evaluation. But family does not want anything aggressive to be done at this time. He gets recurrance only every 2-3 years. So its a reasonable approch to manage it conservatively at his age. He is eating and drinking fine. Has goog BMs after wards. We watched him overnight. No recurrence, no abdominal pain. Passing gas and moving bowels.     He needs to stay on a good bowel regimen to have regular BMs. Continue miralax, lian-s, and prn MOM on top of that  as needed to regulate his BMs. Can use metamucil too in addition. He should move his bowels every day or at least every  Other day.      #Dementia: Oriented to self and year, not place or situation. At mental baseline per daughter. Has noticed worsening of dementia following recent nursing home move. Also is hard of hearing. Daughter reports somewhat frequent mild falls and gets out of bed frequently. Continue trazodone at Hs for sleeping, along with melatonin. I would use some atarax to control agitation on prn basis. I have added that to this medication list. Scheduled buspar and neurontin could be good options going forward for agitation in a dementia patient. Recommend [facility] geriatric psychiatry.      # Rising Troponin, Tachycardia: LIkely Demand ishcemia in elderly individual. No documented ho MI. Denies acute chest pain. EKG unchanged from yesterday, no old EKG for comparison. Restart plavix. Spoke with Daughter Rachele. She does not want any further investigation to be done or any other treatment to be done for any other things  Other than the current issue of volvulus.      # Syncope  While in ER: Could be vasovagal. Now troponin going up. We wanted further evaluation but famiily declined any further assessment.      # Ho prostate Cancer, BPH: Continue flomax.   # Hiatal Hernia and ho GERD. Continue protonix.                Discharge Disposition:   Discharged to home  Memory care unit           Condition on Discharge:   Discharge condition: Stable   Code status on discharge: DNR / DNI           Procedures:     SP flexible sigmoidoscopy to reduce the volvulus          Consultations:   Consultation during this admission received from GI.               Final Day of Progress before Discharge:       Physical Exam:  Blood pressure 126/68, pulse 85, temperature 97.5  F (36.4  C), temperature source Axillary, resp. rate 18, weight 71.7 kg (158 lb), SpO2 95 %.  GENERAL: Alert and oriented x 3. NAD.   HEENT: Anicteric  sclera. PERRL. Mucous membranes moist and without lesions.   RESPIRATORY: Effort normal. Lungs CTAB with no wheezing, rales, rhonchi.   GI: mild distention of abdomen. NOn tender. He had three BMs today .      Data:  All laboratory data reviewed             Significant Results:     Results for orders placed or performed during the hospital encounter of 07/13/18   FLEXIBLE SIGMOIDOSCOPY   Result Value Ref Range    Flex Sig       [facility], [place]  [address] [facility] [place] [address] [phone]     Endoscopy Department  _______________________________________________________________________________  Patient Name: Hardy Acevedo           Procedure Date: 7/14/2018 2:47 AM  MRN: [id number]                       Account Number: [id number]  YOB: 1923               Admit Type: Inpatient  Age: 89                                Gender: Male  Note Status: Finalized                Attending MD: Marco A Cheung MD  Total Sedation Time:                    _______________________________________________________________________________     Procedure:           Flexible Sigmoidoscopy  Indications:         Volvulus  Providers:           Marco A Cheung MD, Tremaine Crane MD  Patient Profile:     89 y.o male with PMH significant for recurrent sigmoid                        volvulus requiring endoscopic reduction last one in May                        2016 who presented with abdomina l pain and significant                        distention with CT A/P in the ED showing sigmoid                        volvulus. Flex sigmoidoscopy for reduction.  Referring MD:          Medicines:           General Anesthesia  Complications:       No immediate complications.  _______________________________________________________________________________  Procedure:           Pre-Anesthesia Assessment:                       - Prior to the procedure, a History and Physical was                        performed,  and patient medications and allergies were                        reviewed. The patient is unable to give consent                        secondary to the patient being legally incompetent to                        consent. The risks and benefits of the procedure and the                        sedation options and risks were discussed with the                        patient's daughter. All questions were answered and                        informed consent was obtained. Patient sunitha ntification                        and proposed procedure were verified by the physician,                        the nurse and the anesthesiologist in the procedure                        room. Mental Status Examination: alert but confused.                        Airway Examination: Mallampati Class II (the uvula but                        not tonsillar pillars visualized). Respiratory                        Examination: clear to auscultation. CV Examination:                        normal. Prophylactic Antibiotics: The patient does not                        require prophylactic antibiotics. Prior Anticoagulants:                        The patient has taken Plavix (clopidogrel), last dose                        was day of procedure. ASA Grade Assessment: III - A                        patient with severe systemic disease. After reviewing                        the risks and benefits, the patient was deemed in                        satisfactory condition to undergo the procedure. The                         anesthesia plan was to use general anesthesia.                        Immediately prior to administration of medications, the                        patient was re-assessed for adequacy to receive                        sedatives. The heart rate, respiratory rate, oxygen                        saturations, blood pressure, adequacy of pulmonary                        ventilation, and response to care were monitored                         throughout the procedure. The physical status of the                        patient was re-assessed after the procedure.                       After obtaining informed consent, the scope was passed                        under direct vision. The Colonoscope was introduced                        through the anus and advanced to the splenic flexure.                        After obtaining informed consent, the scope was passed                        under direct vision.The flexible sigmoidoscopy was                        accomplishe d without difficulty. The patient tolerated                        the procedure well.                                                                                   Findings:       The perianal and digital rectal examinations were normal.       A volvulus, with viable appearing mucosa, was found in the sigmoid        colon. Decompression of the volvulus was attempted and was successful,        with complete decompression achieved. The abdomen was soft without        distension at the end of the procedure.                                                                                   Impression:          - Volvulus. Successful complete decompression achieved.                       - No specimens collected.  Recommendation:      - Clear liquid diet and ADAT tomorrow in am                       - Please obtain abdominal XR to confirm decompression                       - Conitnue bowel regimen                       - [facility] patient [facility].                                                                                      ____________________  Marco A Cheung MD  7/14/2018 3:30:51 AM  I was physically present for the entire viewing portion of the exam.  __________________________  Signature of teaching physician  [id number] A Cheung MD    _____________________________  Tremaine Crane MD  Number of Addenda: 0    Note Initiated On: 7/14/2018  2:47 AM  Scope In:  Scope Out:     CT Abdomen Pelvis w Contrast    Narrative    EXAMINATION: CT ABDOMEN PELVIS W CONTRAST, 7/13/2018 10:00 PM    TECHNIQUE: Helical CT images from the lung bases through the symphysis  pubis were obtained with IV contrast. Contrast dose: 78cc isovue 370    COMPARISON: None available. Outside dictation 9/23/2016.    HISTORY: ? bowel obstruction, abd distension, pain    FINDINGS:    Abdomen and pelvis:   There is abnormal spiraling of the superior rectum with twisting of  the sigmoid colon. There is marked distention of the colon, including  the twisted sigmoid colon, measuring up to 10.6 cm in diameter. The  small bowel is not significantly distended. Loops of small bowel in  the left lower quadrant extend into a left inguinal hernia. The  distended colon has significant mass effect on the stomach. No  evidence of pneumatosis, portal venous gas, or intra-abdominal free  air.    1.5 cm enhancing lesion in hepatic segment 48, at the dome, series 5  image 38, likely representing a hemangioma. Otherwise, unremarkable  liver parenchyma. Gallbladder, spleen, partially atrophic pancreas,  and adrenal glands appear normal. Small splenule anterior to the  splenic hilum. Mild age-related dilation of common bile duct.  Scattered too small to characterize hypodensities in the kidneys  bilaterally and a simple renal cyst in the anterior right kidney. No  hydronephrosis. Small amount of pelvic free fluid. The abdominal aorta  is tortuous, coursing to the right of the IVC. The major abdominal  vasculature is patent. No lymphadenopathy in the abdomen or pelvis.    Lung bases:   The heart is nonenlarged. Extensive atherosclerotic calcifications. No  pericardial effusion. Small to moderate sliding hiatal hernia.  Bibasilar atelectasis.    Bones and soft tissues:   Straightening of the normal lumbar lordosis with multilevel  spondylosis. No acute or worrisome osseous lesions. Old lateral right  rib  fractures.        Impression    IMPRESSION:   1. Sigmoid volvulus with associated pan colonic distention. No  evidence of intra-abdominal free air, pneumatosis, or portal venous  gas.  2. Left inguinal hernia containing nonobstructed loops of small bowel.  3. Small amount of pelvic free fluid.   4. Small to moderate sliding hiatal hernia.      [Result: Sigmoid volvulus]    Finding was identified on 7/13/2018 10:04 PM.     Dr. Kumar was contacted by Dr. Dyer at 7/13/2018 10:10 PM and  verbalized understanding of the urgent finding.     I have personally reviewed the examination and initial interpretation  and I agree with the findings.    MARYELLEN DEWEY MD   XR Abdomen Port 1 View    Narrative    Exam: XR ABDOMEN PORT 1 VW, 7/14/2018 5:27 AM    Indication: Sigmoid decompression    Comparison: 7/13/2018    Findings:   Portable AP views of the abdomen were obtained. Contrast from the  previous CT opacifies the renal collecting systems and urinary  bladder. Possibly mildly decreased degree of gaseous distention. No  pneumatosis identified. Lumbar scoliosis with associated degenerative  changes.      Impression    Impression:   Persistent gaseous distention of the colon although less than on  previous CT. Recommend short-term follow-up radiographs.  I have personally reviewed the examination and initial interpretation  and I agree with the findings.    MARYELLEN DEWEY MD   XR Abdomen 2 Views    Narrative    XR ABDOMEN 2 VW  7/14/2018 11:59 AM      HISTORY: monitor for volvulus;     COMPARISON: 7/13/2018    FINDINGS: Upper and supine frontal views of the abdomen. Persistent  gaseous distention of the colon, decreased from the prior study. No  definite free air. No portal venous gas. Heart size is normal. Lung  bases are relatively clear. Contrast material from prior study within  the urinary bladder.      Impression    IMPRESSION: Persistent gaseous distention of the colon, decreased from  the prior study.    I  have personally reviewed the examination and initial interpretation  and I agree with the findings.    MARYELLEN DEWEY MD   CBC with platelets differential   Result Value Ref Range    WBC 6.4 4.0 - 11.0 10e9/L    RBC Count 4.21 (L) 4.4 - 5.9 10e12/L    Hemoglobin 13.6 13.3 - 17.7 g/dL    Hematocrit 41.3 40.0 - 53.0 %    MCV 98 78 - 100 fl    MCH 32.3 26.5 - 33.0 pg    MCHC 32.9 31.5 - 36.5 g/dL    RDW 15.4 (H) 10.0 - 15.0 %    Platelet Count 191 150 - 450 10e9/L    Diff Method Automated Method     % Neutrophils 82.7 %    % Lymphocytes 13.2 %    % Monocytes 3.9 %    % Eosinophils 0.0 %    % Basophils 0.0 %    % Immature Granulocytes 0.2 %    Nucleated RBCs 0 0 /100    Absolute Neutrophil 5.3 1.6 - 8.3 10e9/L    Absolute Lymphocytes 0.9 0.8 - 5.3 10e9/L    Absolute Monocytes 0.3 0.0 - 1.3 10e9/L    Absolute Eosinophils 0.0 0.0 - 0.7 10e9/L    Absolute Basophils 0.0 0.0 - 0.2 10e9/L    Abs Immature Granulocytes 0.0 0 - 0.4 10e9/L    Absolute Nucleated RBC 0.0    INR   Result Value Ref Range    INR 1.14 0.86 - 1.14   Comprehensive metabolic panel   Result Value Ref Range    Sodium 141 133 - 144 mmol/L    Potassium 3.4 3.4 - 5.3 mmol/L    Chloride 107 94 - 109 mmol/L    Carbon Dioxide 23 20 - 32 mmol/L    Anion Gap 11 3 - 14 mmol/L    Glucose 144 (H) 70 - 99 mg/dL    Urea Nitrogen 26 7 - 30 mg/dL    Creatinine 0.78 0.66 - 1.25 mg/dL    GFR Estimate >90 >60 mL/min/1.7m2    GFR Estimate If Black >90 >60 mL/min/1.7m2    Calcium 9.2 8.5 - 10.1 mg/dL    Bilirubin Total 0.7 0.2 - 1.3 mg/dL    Albumin 4.3 3.4 - 5.0 g/dL    Protein Total 7.5 6.8 - 8.8 g/dL    Alkaline Phosphatase 56 40 - 150 U/L    ALT 16 0 - 70 U/L    AST 14 0 - 45 U/L   Lipase   Result Value Ref Range    Lipase 353 73 - 393 U/L   Lactic acid whole blood   Result Value Ref Range    Lactic Acid 1.5 0.7 - 2.0 mmol/L   Troponin I   Result Value Ref Range    Troponin I ES 0.037 0.000 - 0.045 ug/L   TSH with free T4 reflex   Result Value Ref Range    TSH 1.92  0.40 - 4.00 mU/L   UA with Microscopic   Result Value Ref Range    Color Urine Yellow     Appearance Urine Clear     Glucose Urine Negative NEG^Negative mg/dL    Bilirubin Urine Negative NEG^Negative    Ketones Urine 10 (A) NEG^Negative mg/dL    Specific Gravity Urine 1.029 1.003 - 1.035    Blood Urine Negative NEG^Negative    pH Urine 5.5 5.0 - 7.0 pH    Protein Albumin Urine 30 (A) NEG^Negative mg/dL    Urobilinogen mg/dL Normal 0.0 - 2.0 mg/dL    Nitrite Urine Negative NEG^Negative    Leukocyte Esterase Urine Negative NEG^Negative    Source Unspecified Urine     WBC Urine 1 0 - 5 /HPF    RBC Urine 1 0 - 2 /HPF    Squamous Epithelial /HPF Urine <1 0 - 1 /HPF    Transitional Epi <1 0 - 1 /HPF    Mucous Urine Present (A) NEG^Negative /LPF    Hyaline Casts 1 0 - 2 /LPF   Troponin I   Result Value Ref Range    Troponin I ES 0.068 (H) 0.000 - 0.045 ug/L   Hemoglobin   Result Value Ref Range    Hemoglobin 13.4 13.3 - 17.7 g/dL   Troponin I   Result Value Ref Range    Troponin I ES 0.107 (H) 0.000 - 0.045 ug/L   CBC with platelets   Result Value Ref Range    WBC 14.5 (H) 4.0 - 11.0 10e9/L    RBC Count 4.01 (L) 4.4 - 5.9 10e12/L    Hemoglobin 13.0 (L) 13.3 - 17.7 g/dL    Hematocrit 39.4 (L) 40.0 - 53.0 %    MCV 98 78 - 100 fl    MCH 32.4 26.5 - 33.0 pg    MCHC 33.0 31.5 - 36.5 g/dL    RDW 15.5 (H) 10.0 - 15.0 %    Platelet Count 178 150 - 450 10e9/L   Comprehensive metabolic panel   Result Value Ref Range    Sodium 143 133 - 144 mmol/L    Potassium 3.2 (L) 3.4 - 5.3 mmol/L    Chloride 108 94 - 109 mmol/L    Carbon Dioxide 23 20 - 32 mmol/L    Anion Gap 12 3 - 14 mmol/L    Glucose 131 (H) 70 - 99 mg/dL    Urea Nitrogen 28 7 - 30 mg/dL    Creatinine 0.86 0.66 - 1.25 mg/dL    GFR Estimate 83 >60 mL/min/1.7m2    GFR Estimate If Black >90 >60 mL/min/1.7m2    Calcium 9.1 8.5 - 10.1 mg/dL    Bilirubin Total 0.6 0.2 - 1.3 mg/dL    Albumin 4.0 3.4 - 5.0 g/dL    Protein Total 7.3 6.8 - 8.8 g/dL    Alkaline Phosphatase 52 40 - 150  U/L    ALT 17 0 - 70 U/L    AST 22 0 - 45 U/L   Basic metabolic panel   Result Value Ref Range    Sodium 145 (H) 133 - 144 mmol/L    Potassium 3.6 3.4 - 5.3 mmol/L    Chloride 114 (H) 94 - 109 mmol/L    Carbon Dioxide 23 20 - 32 mmol/L    Anion Gap 8 3 - 14 mmol/L    Glucose 92 70 - 99 mg/dL    Urea Nitrogen 30 7 - 30 mg/dL    Creatinine 0.84 0.66 - 1.25 mg/dL    GFR Estimate 85 >60 mL/min/1.7m2    GFR Estimate If Black >90 >60 mL/min/1.7m2    Calcium 8.3 (L) 8.5 - 10.1 mg/dL   EKG 12-lead, tracing only   Result Value Ref Range    Interpretation ECG Click View Image link to view waveform and result    EKG 12-lead, tracing only   Result Value Ref Range    Interpretation ECG Click View Image link to view waveform and result    EKG 12-lead, complete   Result Value Ref Range    Interpretation ECG Click View Image link to view waveform and result    GI LUMINAL ADULT IP CONSULT: Patient to be seen: Routine within 24 hrs; Call back #: [phone]; sigmoid volvulus; Consultant may enter orders: Yes    Narrative    Tremaine Traylor MD     7/14/2018  5:41 AM  Brief GI note      Consult note already placed in EPIC       Tremaine Traylor  GI Fellow       Recent Results (from the past 48 hour(s))   CT Abdomen Pelvis w Contrast    Narrative    EXAMINATION: CT ABDOMEN PELVIS W CONTRAST, 7/13/2018 10:00 PM    TECHNIQUE: Helical CT images from the lung bases through the symphysis  pubis were obtained with IV contrast. Contrast dose: 78cc isovue 370    COMPARISON: None available. Outside dictation 9/23/2016.    HISTORY: ? bowel obstruction, abd distension, pain    FINDINGS:    Abdomen and pelvis:   There is abnormal spiraling of the superior rectum with twisting of  the sigmoid colon. There is marked distention of the colon, including  the twisted sigmoid colon, measuring up to 10.6 cm in diameter. The  small bowel is not significantly distended. Loops of small bowel in  the left lower quadrant extend into a left inguinal  hernia. The  distended colon has significant mass effect on the stomach. No  evidence of pneumatosis, portal venous gas, or intra-abdominal free  air.    1.5 cm enhancing lesion in hepatic segment 48, at the dome, series 5  image 38, likely representing a hemangioma. Otherwise, unremarkable  liver parenchyma. Gallbladder, spleen, partially atrophic pancreas,  and adrenal glands appear normal. Small splenule anterior to the  splenic hilum. Mild age-related dilation of common bile duct.  Scattered too small to characterize hypodensities in the kidneys  bilaterally and a simple renal cyst in the anterior right kidney. No  hydronephrosis. Small amount of pelvic free fluid. The abdominal aorta  is tortuous, coursing to the right of the IVC. The major abdominal  vasculature is patent. No lymphadenopathy in the abdomen or pelvis.    Lung bases:   The heart is nonenlarged. Extensive atherosclerotic calcifications. No  pericardial effusion. Small to moderate sliding hiatal hernia.  Bibasilar atelectasis.    Bones and soft tissues:   Straightening of the normal lumbar lordosis with multilevel  spondylosis. No acute or worrisome osseous lesions. Old lateral right  rib fractures.        Impression    IMPRESSION:   1. Sigmoid volvulus with associated pan colonic distention. No  evidence of intra-abdominal free air, pneumatosis, or portal venous  gas.  2. Left inguinal hernia containing nonobstructed loops of small bowel.  3. Small amount of pelvic free fluid.   4. Small to moderate sliding hiatal hernia.      [Result: Sigmoid volvulus]    Finding was identified on 7/13/2018 10:04 PM.     Dr. Kumar was contacted by Dr. Dyer at 7/13/2018 10:10 PM and  verbalized understanding of the urgent finding.     I have personally reviewed the examination and initial interpretation  and I agree with the findings.    MARYELLEN DEWEY MD   XR Abdomen Port 1 View    Narrative    Exam: XR ABDOMEN PORT 1 VW, 7/14/2018 5:27 AM    Indication:  Sigmoid decompression    Comparison: 7/13/2018    Findings:   Portable AP views of the abdomen were obtained. Contrast from the  previous CT opacifies the renal collecting systems and urinary  bladder. Possibly mildly decreased degree of gaseous distention. No  pneumatosis identified. Lumbar scoliosis with associated degenerative  changes.      Impression    Impression:   Persistent gaseous distention of the colon although less than on  previous CT. Recommend short-term follow-up radiographs.  I have personally reviewed the examination and initial interpretation  and I agree with the findings.    MARYELLEN DEWEY MD   XR Abdomen 2 Views    Narrative    XR ABDOMEN 2 VW  7/14/2018 11:59 AM      HISTORY: monitor for volvulus;     COMPARISON: 7/13/2018    FINDINGS: Upper and supine frontal views of the abdomen. Persistent  gaseous distention of the colon, decreased from the prior study. No  definite free air. No portal venous gas. Heart size is normal. Lung  bases are relatively clear. Contrast material from prior study within  the urinary bladder.      Impression    IMPRESSION: Persistent gaseous distention of the colon, decreased from  the prior study.    I have personally reviewed the examination and initial interpretation  and I agree with the findings.    MARYELLEN DEWEY MD                Pending Results:   Unresulted Labs Ordered in the Past 30 Days of this Admission     No orders found from 5/14/2018 to 7/14/2018.                  Discharge Instructions and Follow-Up:     Discharge Procedure Orders  Reason for your hospital stay   Order Comments: Admitted for sigmoid volvulus SP reduction of volvulus by flex sig     Adult [facility] Follow-up and recommended labs and tests   Order Comments: Follow up with primary care provider, Provider Not In System, within 7 days for hospital follow- up.  No follow up labs or test are needed.      Appointments on [place] and/or [facility] (with [facility] or [facility]  provider or service). Call [phone] if you haven't heard regarding these appointments within 7 days of discharge.     Activity   Order Comments: Your activity upon discharge: activity as tolerated   Order Specific Question Answer Comments   Is discharge order? Yes      Diet   Order Comments: Follow this diet upon discharge: Orders Placed This Encounter     Regular Diet Adult   Order Specific Question Answer Comments   Is discharge order? Yes               Lyndon Hua  Internal Medicine Staff Hospitalist  [phone]  July 15, 2018        Time spent on patient: 25 minutes total including face to face and coordinating care time reviewing current illness, any medication changes, and the care plan for today.

## 2018-07-15 NOTE — PROGRESS NOTES
Colorectal Surgery Progress Note    Hardy Acevedo  [id number]    No acute events overnight. Afebrile, VSS. Passing flatus, having incontinent stools. No nausea or emesis. Tolerating regular diet.     Temp:  [96.5  F (35.8  C)-100.2  F (37.9  C)] 97.5  F (36.4  C)  Pulse:  [] 85  Heart Rate:  [] 77  Resp:  [16-20] 18  BP: ()/(57-83) 126/68  SpO2:  [93 %-98 %] 95 %    Intake/Output Summary (Last 24 hours) at 07/15/18 0806  Last data filed at 07/15/18 0700   Gross per 24 hour   Intake              390 ml   Output              525 ml   Net             -135 ml     NAD, resting comfortably in bed  non-labored breathing on RA  soft, non-tender, minimal distension     WBC 6.4 -> 14.5 yesterday     89 year old male with hx of TIA on plavix and dementia who presents with sigmoid volvulus, s/p successful endoscopic decompression yesterday.     - No indication for emergent surgical intervention  - Would be reasonable to consider operative intervention given high risk of recurrence. However, in the setting of advanced age, long periods of time between prior recurrences, and family preference, would also be reasonable to opt for non-op mgmt. High likelihood that operation would result in permanent ostomy since risks of anastomotic leak out-ways benefit of re-connection.  - At this time, patient and family favor mgmt without surgery-  Plan for d/c today.    Seen with colorectal fellow who will discuss with staff.     Oralia Reilly MD  General Surgery PGY-3

## 2018-07-15 NOTE — PLAN OF CARE
Problem: Patient Care Overview  Goal: Plan of Care/Patient Progress Review  Outcome: No Change    AVSS, afebrile. Pt has dementia, disoriented x 4. Sitter at bedside. Pt sleeping intermittently overnight. Incontinent of stool and urine x 2. Plan to d/c back to memory care unit today. Cont POC.

## 2018-07-15 NOTE — PLAN OF CARE
Problem: Patient Care Overview  Goal: Plan of Care/Patient Progress Review  PT7D: defer- per chart review and discussion with RN. Pt frequently agitated and does not follow commands since admission. He is from memory care facility at baseline and discharging back home to facility this pm where all needs are met through services provided by facility. He was up ambulating with RN during his stay at baseline mobility and for these reasons is not appropriate for acute care PT needs. Please re-consult if major changes in function occur.

## 2018-07-15 NOTE — PROGRESS NOTES
Discharge  D: Orders for discharge and outpatient medications written.  I: Home medications and return to clinic schedule reviewed with patient. Discharge instructions and parameters for calling Health Care Provider reviewed. Patient left at 1300 accompanied by EMS transport back to [facility].   A: Patient/family verbalized understanding and was ready for discharge.   P: No medications to , pt is from nursing home and all meds are administered through home with 24 hour nursing care.

## 2018-07-16 LAB — INTERPRETATION ECG - MUSE: NORMAL

## 2018-07-16 ASSESSMENT — ENCOUNTER SYMPTOMS
NAUSEA: 0
COUGH: 0
SHORTNESS OF BREATH: 0
FEVER: 0
BLOOD IN STOOL: 0
VOMITING: 0

## 2024-08-14 NOTE — PLAN OF CARE
Problem: Constipation (Adult)  Goal: Identify Related Risk Factors and Signs and Symptoms  Related risk factors and signs and symptoms are identified upon initiation of Human [employer] Clinical Practice Guideline (CPG).   Pt HR is tachy, elevated troponin of 0.107 MD notified and EKG ordered. MD rounded on patient and spoke with daughter POA about plan of care. Pt is DNR/DNI and goals of care is to keep patient comfortable. Pt denies any chest pain, shortness of breath. Pt is disoriented x4 at baseline. Pt abdomen is soft and no complaints of tenderness or pain. Pt has a sitter at bedside. MD paged this afternoon about increased agitation and confusion. Atarax and Haldol ordered. Atarax given, pt seems to be calming down and is resting in bed. Pt took out IV and new IV placed. K 3.2 replaced x1 and IVF of NS + 20K started at 100cc/hr. Pt had large BM this morning. No more BMs this shift, miralax and other home meds given. Pt daughter came by to see patient and will return this afternoon. Plan is to monitor patient over night and discharge in the AM back to memory care unit with nursing care.        Same meds with EKG 3 months follow up

## (undated) DEVICE — ENDO SYSTEM WATER BOTTLE & TUBING W/CO2 FILTER 00711549

## (undated) DEVICE — WIPE PREMOIST CLEANSING WASHCLOTHS 7988

## (undated) DEVICE — TAPE CLOTH 3" CARDINAL 3TRCL03

## (undated) DEVICE — PAD CHUX UNDERPAD 23X24" 7136

## (undated) DEVICE — SUCTION MANIFOLD DORNOCH ULTRA CART UL-CL500

## (undated) DEVICE — ENDO CAP AND TUBING STERILE FOR ENDOGATOR  100130

## (undated) DEVICE — KIT CONNECTOR FOR OLYMPUS ENDOSCOPES DEFENDO 100310

## (undated) DEVICE — SOL WATER IRRIG 1000ML BOTTLE 2F7114

## (undated) DEVICE — PACK ENDOSCOPY GI CUSTOM UMMC

## (undated) DEVICE — KIT ENDO FIRST STEP DISINFECTANT 200ML W/POUCH EP-4

## (undated) RX ORDER — SIMETHICONE 20 MG/.3ML
EMULSION ORAL
Status: DISPENSED
Start: 2018-07-14

## (undated) RX ORDER — PHENYLEPHRINE HCL IN 0.9% NACL 1 MG/10 ML
SYRINGE (ML) INTRAVENOUS
Status: DISPENSED
Start: 2018-07-14

## (undated) RX ORDER — GLYCOPYRROLATE 0.2 MG/ML
INJECTION, SOLUTION INTRAMUSCULAR; INTRAVENOUS
Status: DISPENSED
Start: 2018-07-14

## (undated) RX ORDER — ONDANSETRON 2 MG/ML
INJECTION INTRAMUSCULAR; INTRAVENOUS
Status: DISPENSED
Start: 2018-07-14

## (undated) RX ORDER — PROPOFOL 10 MG/ML
INJECTION, EMULSION INTRAVENOUS
Status: DISPENSED
Start: 2018-07-14

## (undated) RX ORDER — FENTANYL CITRATE 50 UG/ML
INJECTION, SOLUTION INTRAMUSCULAR; INTRAVENOUS
Status: DISPENSED
Start: 2018-07-14